# Patient Record
Sex: FEMALE | Race: WHITE | Employment: FULL TIME | URBAN - METROPOLITAN AREA
[De-identification: names, ages, dates, MRNs, and addresses within clinical notes are randomized per-mention and may not be internally consistent; named-entity substitution may affect disease eponyms.]

---

## 2017-08-10 RX ORDER — ESTRADIOL 0.5 MG/1
TABLET ORAL
Qty: 90 TABLET | Refills: 3 | Status: SHIPPED | OUTPATIENT
Start: 2017-08-10 | End: 2018-10-17 | Stop reason: SDUPTHER

## 2017-08-31 ENCOUNTER — OFFICE VISIT (OUTPATIENT)
Dept: OBGYN CLINIC | Age: 57
End: 2017-08-31
Payer: COMMERCIAL

## 2017-08-31 VITALS
HEIGHT: 63 IN | BODY MASS INDEX: 26.05 KG/M2 | WEIGHT: 147 LBS | SYSTOLIC BLOOD PRESSURE: 148 MMHG | DIASTOLIC BLOOD PRESSURE: 100 MMHG

## 2017-08-31 DIAGNOSIS — Z12.31 ENCOUNTER FOR SCREENING MAMMOGRAM FOR MALIGNANT NEOPLASM OF BREAST: Primary | ICD-10-CM

## 2017-08-31 PROCEDURE — 99396 PREV VISIT EST AGE 40-64: CPT | Performed by: OBSTETRICS & GYNECOLOGY

## 2017-08-31 ASSESSMENT — ENCOUNTER SYMPTOMS
BACK PAIN: 0
DIARRHEA: 0
ABDOMINAL PAIN: 0
COUGH: 0
TROUBLE SWALLOWING: 0
APNEA: 0
BLOOD IN STOOL: 0
CHEST TIGHTNESS: 0
SHORTNESS OF BREATH: 0
RECTAL PAIN: 0
SORE THROAT: 0
NAUSEA: 0
WHEEZING: 0
ABDOMINAL DISTENTION: 0
PHOTOPHOBIA: 0
ANAL BLEEDING: 0
CONSTIPATION: 0

## 2017-09-25 DIAGNOSIS — Z12.31 ENCOUNTER FOR SCREENING MAMMOGRAM FOR MALIGNANT NEOPLASM OF BREAST: ICD-10-CM

## 2018-04-04 ENCOUNTER — HOSPITAL ENCOUNTER (OUTPATIENT)
Age: 58
Setting detail: SPECIMEN
Discharge: HOME | End: 2018-04-04
Payer: COMMERCIAL

## 2018-04-04 LAB
ALBUMIN SERPL-MCNC: 4.2 G/DL (ref 3.5–5.2)
ALBUMIN/GLOBULIN RATIO: 1.2 (ref 1–2.5)
ALP BLD-CCNC: 83 U/L (ref 35–104)
ALT SERPL-CCNC: 14 U/L (ref 5–33)
ANION GAP SERPL CALCULATED.3IONS-SCNC: 10 MMOL/L (ref 9–17)
AST SERPL-CCNC: 21 U/L
BILIRUB SERPL-MCNC: 0.31 MG/DL (ref 0.3–1.2)
BILIRUBIN DIRECT: <0.08 MG/DL
BILIRUBIN, INDIRECT: NORMAL MG/DL (ref 0–1)
BUN BLDV-MCNC: 16 MG/DL (ref 6–20)
CALCIUM SERPL-MCNC: 9.1 MG/DL (ref 8.6–10.4)
CHLORIDE BLD-SCNC: 104 MMOL/L (ref 98–107)
CHOLESTEROL/HDL RATIO: 3.4
CHOLESTEROL: 257 MG/DL
CO2: 26 MMOL/L (ref 20–31)
CREAT SERPL-MCNC: 0.76 MG/DL (ref 0.5–0.9)
GFR AFRICAN AMERICAN: >60 ML/MIN
GFR NON-AFRICAN AMERICAN: >60 ML/MIN
GFR SERPL CREATININE-BSD FRML MDRD: NORMAL ML/MIN/{1.73_M2}
GFR SERPL CREATININE-BSD FRML MDRD: NORMAL ML/MIN/{1.73_M2}
GLUCOSE BLD-MCNC: 84 MG/DL (ref 70–99)
HCT VFR BLD CALC: 41.1 % (ref 36.3–47.1)
HDLC SERPL-MCNC: 75 MG/DL
HEMOGLOBIN: 13.2 G/DL (ref 11.9–15.1)
LDL CHOLESTEROL: 160 MG/DL (ref 0–130)
MCH RBC QN AUTO: 30.3 PG (ref 25.2–33.5)
MCHC RBC AUTO-ENTMCNC: 32.1 G/DL (ref 28.4–34.8)
MCV RBC AUTO: 94.5 FL (ref 82.6–102.9)
NRBC AUTOMATED: 0 PER 100 WBC
PDW BLD-RTO: 12.4 % (ref 11.8–14.4)
PLATELET # BLD: 242 K/UL (ref 138–453)
PMV BLD AUTO: 11.9 FL (ref 8.1–13.5)
POTASSIUM SERPL-SCNC: 4.2 MMOL/L (ref 3.7–5.3)
RBC # BLD: 4.35 M/UL (ref 3.95–5.11)
SODIUM BLD-SCNC: 140 MMOL/L (ref 135–144)
TOTAL PROTEIN: 7.6 G/DL (ref 6.4–8.3)
TRIGL SERPL-MCNC: 112 MG/DL
VLDLC SERPL CALC-MCNC: ABNORMAL MG/DL (ref 1–30)
WBC # BLD: 8.4 K/UL (ref 3.5–11.3)

## 2018-09-23 RX ORDER — ESTRADIOL 0.5 MG/1
TABLET ORAL
Qty: 90 TABLET | Refills: 3 | OUTPATIENT
Start: 2018-09-23

## 2018-09-24 ENCOUNTER — TELEPHONE (OUTPATIENT)
Dept: OBGYN CLINIC | Age: 58
End: 2018-09-24

## 2018-09-26 RX ORDER — ESTRADIOL 0.5 MG/1
0.5 TABLET ORAL DAILY
Qty: 90 TABLET | Refills: 4 | Status: SHIPPED | OUTPATIENT
Start: 2018-09-26 | End: 2018-10-17 | Stop reason: SDUPTHER

## 2018-09-26 RX ORDER — ESTRADIOL 0.5 MG/1
0.5 TABLET ORAL DAILY
Qty: 30 TABLET | Refills: 3 | Status: SHIPPED | OUTPATIENT
Start: 2018-09-26 | End: 2018-09-26 | Stop reason: SDUPTHER

## 2018-10-17 ENCOUNTER — OFFICE VISIT (OUTPATIENT)
Dept: OBGYN CLINIC | Age: 58
End: 2018-10-17
Payer: COMMERCIAL

## 2018-10-17 VITALS
BODY MASS INDEX: 26.75 KG/M2 | SYSTOLIC BLOOD PRESSURE: 128 MMHG | WEIGHT: 151 LBS | HEIGHT: 63 IN | DIASTOLIC BLOOD PRESSURE: 86 MMHG

## 2018-10-17 DIAGNOSIS — Z01.419 ENCOUNTER FOR ROUTINE GYNECOLOGICAL EXAMINATION WITH PAPANICOLAOU SMEAR OF CERVIX: Primary | ICD-10-CM

## 2018-10-17 DIAGNOSIS — Z12.11 ENCOUNTER FOR FECAL IMMUNOCHEMICAL TEST SCREENING: ICD-10-CM

## 2018-10-17 DIAGNOSIS — Z12.31 ENCOUNTER FOR SCREENING MAMMOGRAM FOR BREAST CANCER: ICD-10-CM

## 2018-10-17 PROCEDURE — 99396 PREV VISIT EST AGE 40-64: CPT | Performed by: OBSTETRICS & GYNECOLOGY

## 2018-10-17 RX ORDER — ESTRADIOL 0.5 MG/1
0.5 TABLET ORAL DAILY
Qty: 90 TABLET | Refills: 3 | Status: SHIPPED | OUTPATIENT
Start: 2018-10-17 | End: 2019-11-12 | Stop reason: SDUPTHER

## 2018-10-17 ASSESSMENT — ENCOUNTER SYMPTOMS
NAUSEA: 0
SORE THROAT: 0
CONSTIPATION: 0
TROUBLE SWALLOWING: 0
ABDOMINAL DISTENTION: 0
WHEEZING: 0
DIARRHEA: 0
APNEA: 0
PHOTOPHOBIA: 0
ANAL BLEEDING: 0
BACK PAIN: 0
BLOOD IN STOOL: 0
SHORTNESS OF BREATH: 0
COUGH: 0
RECTAL PAIN: 0
CHEST TIGHTNESS: 0
ABDOMINAL PAIN: 0

## 2018-10-17 NOTE — PROGRESS NOTES
10:43 AM   Result Value Ref Range    Cholesterol 257 (H) <200 mg/dL    HDL 75 >40 mg/dL    LDL Cholesterol 160 (H) 0 - 130 mg/dL    Chol/HDL Ratio 3.4 <5    Triglycerides 112 <150 mg/dL    VLDL NOT REPORTED 1 - 30 mg/dL     Past Medical History:   Diagnosis Date    Asthma     Migraine       Past Surgical History:   Procedure Laterality Date    BREAST SURGERY Right     benign cyst age 27    Lien Dunk EYE SURGERY      ag e 3 for lazy eye    JANAE AND BSO  2004     Family History   Problem Relation Age of Onset    Heart Disease Mother         heart valve replacement    Hypertension Father         stroke    Heart Attack Maternal Grandmother     Heart Attack Maternal Grandfather     Diabetes Paternal Grandfather      History   Smoking Status    Never Smoker   Smokeless Tobacco    Never Used     History   Alcohol Use    Yes     Comment: occasional beer       MEDICATIONS:  Current Outpatient Prescriptions   Medication Sig Dispense Refill    estradiol (ESTRACE) 0.5 MG tablet Take 1 tablet by mouth daily 90 tablet 3    butalbital-acetaminophen-caffeine (FIORICET) -40 MG per tablet Take 1 tablet by mouth every 4 hours as needed for Headaches 30 tablet 3    Cetirizine HCl (ZYRTEC ALLERGY PO) Take by mouth      aspirin 81 MG tablet Take 81 mg by mouth daily       No current facility-administered medications for this visit. ALLERGIES:  Compazine [prochlorperazine] and Stadol [butorphanol]    Review of Systems   Constitutional: Negative for activity change, appetite change, fatigue, fever and unexpected weight change. HENT: Negative for congestion, dental problem, hearing loss, mouth sores, sore throat and trouble swallowing. Eyes: Negative for photophobia and visual disturbance. Respiratory: Negative for apnea, cough, chest tightness, shortness of breath and wheezing. Cardiovascular: Negative for chest pain, palpitations and leg swelling.    Gastrointestinal: Negative for abdominal distention,

## 2019-04-03 ENCOUNTER — OFFICE VISIT (OUTPATIENT)
Dept: OBGYN CLINIC | Age: 59
End: 2019-04-03
Payer: COMMERCIAL

## 2019-04-03 VITALS
HEIGHT: 62 IN | SYSTOLIC BLOOD PRESSURE: 138 MMHG | BODY MASS INDEX: 28.16 KG/M2 | DIASTOLIC BLOOD PRESSURE: 76 MMHG | WEIGHT: 153 LBS

## 2019-04-03 DIAGNOSIS — N63.20 LEFT BREAST LUMP: Primary | ICD-10-CM

## 2019-04-03 PROCEDURE — 99213 OFFICE O/P EST LOW 20 MIN: CPT | Performed by: OBSTETRICS & GYNECOLOGY

## 2019-04-03 NOTE — PROGRESS NOTES
Martina Carias is being seen for   Chief Complaint   Patient presents with    Breast Mass     left since monday in axilla area, mammogram 09/25/18 [facility]'s to fax result       HPI:The patient is a 59 y.o. Roxy Butler, seen today regarding a mass in left armpit which she noticed when changing her clothes two days ago. Denies respiratory infection symptoms and sore throat. No injury though was moving furniture. Problem is confined to axilla with no pain, redness, nipple discharge or skin changes in either breast.  Is on 0.5 mg of estrace, unchanged  . HPI    Vital Signs  /76   Ht 5' 2.25\" (1.581 m)   Wt 153 lb (69.4 kg)   Breastfeeding? No   BMI 27.76 kg/m²   No results found for this or any previous visit (from the past 2016 hour(s)). OBGyn Exam    Breast:  Tender area in left axilla, maybe enlarged lymph node. Does not involve the breast.  Breasts bilaterally are symmetric, nontender with no masses or nipple discharge. Pelvic: Exam deferred. Assessment:   Diagnosis Orders   1. Left breast lump           PLAN:  Monitor and if seems to be enlarging or becomes painful, contact family doctor for further evaluation. Due for mammogram in September and annual here in October. Patient was notified that mammogram was normal in September; results being faxed from [facility]. Will review when fax arrives. Return to the office 6 months or prn . Patient was seen with total face to face time of 15 minutes.

## 2019-04-04 DIAGNOSIS — Z12.31 ENCOUNTER FOR SCREENING MAMMOGRAM FOR BREAST CANCER: ICD-10-CM

## 2019-11-12 ENCOUNTER — OFFICE VISIT (OUTPATIENT)
Dept: OBGYN CLINIC | Age: 59
End: 2019-11-12
Payer: COMMERCIAL

## 2019-11-12 VITALS
WEIGHT: 149.8 LBS | HEIGHT: 62 IN | BODY MASS INDEX: 27.57 KG/M2 | DIASTOLIC BLOOD PRESSURE: 70 MMHG | SYSTOLIC BLOOD PRESSURE: 130 MMHG

## 2019-11-12 DIAGNOSIS — Z01.419 WOMEN'S ANNUAL ROUTINE GYNECOLOGICAL EXAMINATION: Primary | ICD-10-CM

## 2019-11-12 DIAGNOSIS — Z12.31 ENCOUNTER FOR SCREENING MAMMOGRAM FOR BREAST CANCER: ICD-10-CM

## 2019-11-12 PROCEDURE — 99396 PREV VISIT EST AGE 40-64: CPT | Performed by: OBSTETRICS & GYNECOLOGY

## 2019-11-12 RX ORDER — ESTRADIOL 0.5 MG/1
0.5 TABLET ORAL DAILY
Qty: 90 TABLET | Refills: 3 | Status: SHIPPED | OUTPATIENT
Start: 2019-11-12

## 2019-11-12 RX ORDER — BUTALBITAL, ACETAMINOPHEN AND CAFFEINE 50; 325; 40 MG/1; MG/1; MG/1
1 TABLET ORAL EVERY 4 HOURS PRN
Qty: 30 TABLET | Refills: 3 | Status: SHIPPED | OUTPATIENT
Start: 2019-11-12

## 2019-11-12 ASSESSMENT — ENCOUNTER SYMPTOMS
CONSTIPATION: 0
COUGH: 0
CHEST TIGHTNESS: 0
TROUBLE SWALLOWING: 0
ABDOMINAL DISTENTION: 0
RECTAL PAIN: 0
NAUSEA: 0
SORE THROAT: 0
DIARRHEA: 0
SHORTNESS OF BREATH: 0
ANAL BLEEDING: 0
PHOTOPHOBIA: 0
BLOOD IN STOOL: 0
APNEA: 0
ABDOMINAL PAIN: 0
BACK PAIN: 0
WHEEZING: 0

## 2019-11-21 ENCOUNTER — HOSPITAL ENCOUNTER (OUTPATIENT)
Age: 59
Discharge: HOME | End: 2019-11-21
Payer: COMMERCIAL

## 2019-11-21 LAB
ALBUMIN SERPL-MCNC: 4.2 G/DL (ref 3.5–5.2)
ALBUMIN/GLOBULIN RATIO: 1.3 (ref 1–2.5)
ALP BLD-CCNC: 88 U/L (ref 35–104)
ALT SERPL-CCNC: 16 U/L (ref 5–33)
ANION GAP SERPL CALCULATED.3IONS-SCNC: 10 MMOL/L (ref 9–17)
AST SERPL-CCNC: 19 U/L
BILIRUB SERPL-MCNC: 0.24 MG/DL (ref 0.3–1.2)
BUN BLDV-MCNC: 14 MG/DL (ref 8–23)
BUN/CREAT BLD: ABNORMAL (ref 9–20)
CALCIUM SERPL-MCNC: 8.8 MG/DL (ref 8.6–10.4)
CHLORIDE BLD-SCNC: 104 MMOL/L (ref 98–107)
CHOLESTEROL/HDL RATIO: 3.7
CHOLESTEROL: 268 MG/DL
CO2: 26 MMOL/L (ref 20–31)
CREAT SERPL-MCNC: 0.76 MG/DL (ref 0.5–0.9)
GFR AFRICAN AMERICAN: >60 ML/MIN
GFR NON-AFRICAN AMERICAN: >60 ML/MIN
GFR SERPL CREATININE-BSD FRML MDRD: ABNORMAL ML/MIN/{1.73_M2}
GFR SERPL CREATININE-BSD FRML MDRD: ABNORMAL ML/MIN/{1.73_M2}
GLUCOSE BLD-MCNC: 94 MG/DL (ref 70–99)
HCT VFR BLD CALC: 43 % (ref 36.3–47.1)
HDLC SERPL-MCNC: 72 MG/DL
HEMOGLOBIN: 13.8 G/DL (ref 11.9–15.1)
LDL CHOLESTEROL: 178 MG/DL (ref 0–130)
MCH RBC QN AUTO: 30.6 PG (ref 25.2–33.5)
MCHC RBC AUTO-ENTMCNC: 32.1 G/DL (ref 28.4–34.8)
MCV RBC AUTO: 95.3 FL (ref 82.6–102.9)
NRBC AUTOMATED: 0 PER 100 WBC
PDW BLD-RTO: 12.3 % (ref 11.8–14.4)
PLATELET # BLD: 259 K/UL (ref 138–453)
PMV BLD AUTO: 12.1 FL (ref 8.1–13.5)
POTASSIUM SERPL-SCNC: 4.3 MMOL/L (ref 3.7–5.3)
RBC # BLD: 4.51 M/UL (ref 3.95–5.11)
SODIUM BLD-SCNC: 140 MMOL/L (ref 135–144)
TOTAL PROTEIN: 7.4 G/DL (ref 6.4–8.3)
TRIGL SERPL-MCNC: 89 MG/DL
VLDLC SERPL CALC-MCNC: ABNORMAL MG/DL (ref 1–30)
WBC # BLD: 5.5 K/UL (ref 3.5–11.3)

## 2019-11-21 PROCEDURE — 80061 LIPID PANEL: CPT

## 2019-11-21 PROCEDURE — 80053 COMPREHEN METABOLIC PANEL: CPT

## 2019-11-21 PROCEDURE — 85027 COMPLETE CBC AUTOMATED: CPT

## 2019-11-21 PROCEDURE — 36415 COLL VENOUS BLD VENIPUNCTURE: CPT

## 2019-11-25 DIAGNOSIS — Z12.31 ENCOUNTER FOR SCREENING MAMMOGRAM FOR BREAST CANCER: ICD-10-CM

## 2022-06-07 ENCOUNTER — HOSPITAL ENCOUNTER (EMERGENCY)
Age: 62
Discharge: HOME | End: 2022-06-07
Attending: EMERGENCY MEDICINE
Payer: COMMERCIAL

## 2022-06-07 VITALS
TEMPERATURE: 97.7 F | HEART RATE: 95 BPM | DIASTOLIC BLOOD PRESSURE: 79 MMHG | OXYGEN SATURATION: 96 % | SYSTOLIC BLOOD PRESSURE: 147 MMHG | RESPIRATION RATE: 15 BRPM

## 2022-06-07 DIAGNOSIS — R19.7 DIARRHEA OF PRESUMED INFECTIOUS ORIGIN: Primary | ICD-10-CM

## 2022-06-07 LAB
ABSOLUTE EOS #: 0.11 K/UL (ref 0–0.44)
ABSOLUTE IMMATURE GRANULOCYTE: <0.03 K/UL (ref 0–0.3)
ABSOLUTE LYMPH #: 2.17 K/UL (ref 1.1–3.7)
ABSOLUTE MONO #: 0.56 K/UL (ref 0.1–1.2)
ALBUMIN SERPL-MCNC: 4.3 G/DL (ref 3.5–5.2)
ALBUMIN/GLOBULIN RATIO: 1.3 (ref 1–2.5)
ALP BLD-CCNC: 118 U/L (ref 35–104)
ALT SERPL-CCNC: 21 U/L (ref 5–33)
ANION GAP SERPL CALCULATED.3IONS-SCNC: 15 MMOL/L (ref 9–17)
AST SERPL-CCNC: 30 U/L
BASOPHILS # BLD: 0 % (ref 0–2)
BASOPHILS ABSOLUTE: 0.04 K/UL (ref 0–0.2)
BILIRUB SERPL-MCNC: 0.29 MG/DL (ref 0.3–1.2)
BUN BLDV-MCNC: 11 MG/DL (ref 8–23)
CALCIUM SERPL-MCNC: 10 MG/DL (ref 8.6–10.4)
CHLORIDE BLD-SCNC: 98 MMOL/L (ref 98–107)
CO2: 20 MMOL/L (ref 20–31)
CREAT SERPL-MCNC: 0.85 MG/DL (ref 0.5–0.9)
EOSINOPHILS RELATIVE PERCENT: 1 % (ref 1–4)
GFR AFRICAN AMERICAN: >60 ML/MIN
GFR NON-AFRICAN AMERICAN: >60 ML/MIN
GFR SERPL CREATININE-BSD FRML MDRD: ABNORMAL ML/MIN/{1.73_M2}
GLUCOSE BLD-MCNC: 119 MG/DL (ref 70–99)
HCT VFR BLD CALC: 42.2 % (ref 36.3–47.1)
HEMOGLOBIN: 14.5 G/DL (ref 11.9–15.1)
IMMATURE GRANULOCYTES: 0 %
LYMPHOCYTES # BLD: 24 % (ref 24–43)
MCH RBC QN AUTO: 31.3 PG (ref 25.2–33.5)
MCHC RBC AUTO-ENTMCNC: 34.4 G/DL (ref 28.4–34.8)
MCV RBC AUTO: 90.9 FL (ref 82.6–102.9)
MONOCYTES # BLD: 6 % (ref 3–12)
NRBC AUTOMATED: 0 PER 100 WBC
PDW BLD-RTO: 12.3 % (ref 11.8–14.4)
PLATELET # BLD: 270 K/UL (ref 138–453)
PMV BLD AUTO: 11.8 FL (ref 8.1–13.5)
POTASSIUM SERPL-SCNC: 4.2 MMOL/L (ref 3.7–5.3)
RBC # BLD: 4.64 M/UL (ref 3.95–5.11)
SEG NEUTROPHILS: 69 % (ref 36–65)
SEGMENTED NEUTROPHILS ABSOLUTE COUNT: 6.21 K/UL (ref 1.5–8.1)
SODIUM BLD-SCNC: 133 MMOL/L (ref 135–144)
TOTAL PROTEIN: 7.7 G/DL (ref 6.4–8.3)
WBC # BLD: 9.1 K/UL (ref 3.5–11.3)

## 2022-06-07 PROCEDURE — 85025 COMPLETE CBC W/AUTO DIFF WBC: CPT

## 2022-06-07 PROCEDURE — 80053 COMPREHEN METABOLIC PANEL: CPT

## 2022-06-07 PROCEDURE — 99283 EMERGENCY DEPT VISIT LOW MDM: CPT

## 2022-06-07 ASSESSMENT — ENCOUNTER SYMPTOMS
DIARRHEA: 0
CONSTIPATION: 0
NAUSEA: 0
VOMITING: 0
SORE THROAT: 0
ABDOMINAL PAIN: 0
SHORTNESS OF BREATH: 0

## 2022-06-07 NOTE — ED PROVIDER NOTES
[facility] ED  Emergency Department Encounter  EmergencyMedicine Resident     Jeff Han  MRN: [id number]  [place]shulgfurt 1960  Date of evaluation: 6/7/22  PCP:  Joselyn Anton DO    CHIEF COMPLAINT       Chief Complaint   Patient presents with    Abdominal Pain     states started yesterday, bloody stools noted with N/V       HISTORY OF PRESENT ILLNESS  (Location/Symptom, Timing/Onset, Context/Setting, Quality, Duration, Modifying Factors, Severity.)      Burton Brito is a 59 y.o. female who presents to the emergency department with a 1 day history of resolving diarrhea with abdominal cramping. Patient made some egg salad yesterday that only she ate, and then later that night had a 30-minute bowel movement consisting of mostly diarrhea and very painful with a lot of abdominal cramping and sweating, and 1 episode of vomiting. Patient slept and then woke up the next day and had another bowel movement, this time with small amounts of blood. She had 1 other episode of diarrhea and so came into the emergency department for evaluation. Each episode of diarrhea is lower in volume and intensity than the first 1 and patient and  are wondering if the egg salad it may have been bad. She denies fever, chills, current nausea or vomiting (none today), chest pain or shortness of breath, abdominal pain currently, problems with urination or bowel movements, or numbness or tingling anywhere. PAST MEDICAL / SURGICAL / SOCIAL / FAMILY HISTORY      has a past medical history of Asthma and Migraine. has a past surgical history that includes marisela and bso (cervix removed) (2004); Eye surgery; and Breast surgery (Right).     Social History     Socioeconomic History    Marital status:      Spouse name: Not on file    Number of children: Not on file    Years of education: Not on file    Highest education level: Not on file   Occupational History    Not on file   Tobacco Use  Smoking status: Never Smoker    Smokeless tobacco: Never Used   Substance and Sexual Activity    Alcohol use: Yes     Comment: occasional beer    Drug use: Not on file    Sexual activity: Not on file   Other Topics Concern    Not on file   Social History Narrative    Not on file     Social Determinants of Health     Financial Resource Strain:     Difficulty of Paying Living Expenses: Not on file   Food Insecurity:     Worried About Running Out of Food in the Last Year: Not on file    Lizzy of Food in the Last Year: Not on file   Transportation Needs:     Lack of Transportation (Medical): Not on file    Lack of Transportation (Non-Medical):  Not on file   Physical Activity:     Days of Exercise per Week: Not on file    Minutes of Exercise per Session: Not on file   Stress:     Feeling of Stress : Not on file   Social Connections:     Frequency of Communication with Friends and Family: Not on file    Frequency of Social Gatherings with Friends and Family: Not on file    Attends Christianity Services: Not on file    Active Member of [address] [place] [address] [employer] or Organizations: Not on file    Attends Club or Organization Meetings: Not on file    Marital Status: Not on file   Intimate Partner Violence:     Fear of Current or Ex-Partner: Not on file    Emotionally Abused: Not on file    Physically Abused: Not on file    Sexually Abused: Not on file   Housing Stability:     Unable to Pay for Housing in the Last Year: Not on file    Number of [place] in the Last Year: Not on file    Unstable Housing in the Last Year: Not on file       Family History   Problem Relation Age of Onset    Heart Disease Mother         heart valve replacement    Hypertension Father         stroke    Heart Attack Maternal Grandmother     Heart Attack Maternal Grandfather     Diabetes Paternal Grandfather        Allergies:  Compazine [prochlorperazine] and Stadol [butorphanol]    Home Medications:  Prior to Admission medications Medication Sig Start Date End Date Taking? Authorizing Provider   butalbital-acetaminophen-caffeine (FIORICET) -40 MG per tablet Take 1 tablet by mouth every 4 hours as needed for Headaches 11/12/19   Abel Riedel, MD   estradiol (ESTRACE) 0.5 MG tablet Take 1 tablet by mouth daily 11/12/19   Abel Riedel, MD   Cetirizine HCl (ZYRTEC ALLERGY PO) Take by mouth    Historical Provider, MD       REVIEW OF SYSTEMS    (2-9 systems for level 4, 10 or more for level 5)      Review of Systems   Constitutional: Negative for chills and fever. HENT: Negative for ear pain, hearing loss and sore throat. Eyes: Negative for visual disturbance. Respiratory: Negative for shortness of breath. Cardiovascular: Negative for chest pain. Gastrointestinal: Negative for abdominal pain, constipation, diarrhea, nausea and vomiting. Genitourinary: Negative for difficulty urinating and dysuria. Musculoskeletal: Negative for arthralgias and myalgias. Neurological: Negative for numbness. Psychiatric/Behavioral: Negative for agitation and confusion. PHYSICAL EXAM   (up to 7 for level 4, 8 or more for level 5)      INITIAL VITALS:   BP (!) 147/79   Pulse 95   Temp 97.7 °F (36.5 °C) (Oral)   Resp 15   SpO2 96%     Physical Exam  Vitals and nursing note reviewed. Constitutional:       General: She is not in acute distress. Appearance: Normal appearance. She is well-developed. She is not ill-appearing or diaphoretic. HENT:      Head: Normocephalic and atraumatic. Right Ear: External ear normal.      Left Ear: External ear normal.      Nose: Nose normal.      Mouth/Throat:      Mouth: Mucous membranes are moist.   Eyes:      Extraocular Movements: Extraocular movements intact. Conjunctiva/sclera: Conjunctivae normal.   Neck:      Trachea: No tracheal deviation. Cardiovascular:      Rate and Rhythm: Normal rate and regular rhythm. Heart sounds: Normal heart sounds. No murmur heard.   No friction rub. No gallop. Pulmonary:      Effort: Pulmonary effort is normal. No respiratory distress. Breath sounds: Normal breath sounds. No wheezing, rhonchi or rales. Abdominal:      General: Abdomen is flat. There is no distension. Palpations: Abdomen is soft. There is no mass. Tenderness: There is no abdominal tenderness. There is no right CVA tenderness, left CVA tenderness, guarding or rebound. Comments: Abdomen demonstrates no tenderness to deep palpation, soft   Genitourinary:     Comments: Digital rectal examination demonstrated palpable hemorrhoid and guaiac positive stool  Musculoskeletal:         General: No swelling, deformity or signs of injury. Normal range of motion. Cervical back: Normal range of motion and neck supple. Skin:     General: Skin is warm and dry. Coloration: Skin is not jaundiced. Findings: No bruising or lesion. Neurological:      General: No focal deficit present. Mental Status: She is alert and oriented to person, place, and time. Mental status is at baseline. Motor: No abnormal muscle tone. DIFFERENTIAL  DIAGNOSIS     PLAN (LABS / IMAGING / EKG):  Orders Placed This Encounter   Procedures    CBC with Auto Differential    Comprehensive Metabolic Panel w/ Reflex to MG       MEDICATIONS ORDERED:  No orders of the defined types were placed in this encounter. DDX: Myles Mann is a 59 y.o. female who presents to the emergency department with abdominal pain, resolved.  Differential diagnosis includes food poisoning, gastroenteritis    DIAGNOSTIC RESULTS / EMERGENCY DEPARTMENT COURSE / MDM   LAB RESULTS:  Results for orders placed or performed during the hospital encounter of 06/07/22   CBC with Auto Differential   Result Value Ref Range    WBC 9.1 3.5 - 11.3 k/uL    RBC 4.64 3.95 - 5.11 m/uL    Hemoglobin 14.5 11.9 - 15.1 g/dL    Hematocrit 42.2 36.3 - 47.1 %    MCV 90.9 82.6 - 102.9 fL    MCH 31.3 25.2 - 33.5 pg    MCHC 34.4 28.4 - 34.8 g/dL    RDW 12.3 11.8 - 14.4 %    Platelets 368 935 - 018 k/uL    MPV 11.8 8.1 - 13.5 fL    NRBC Automated 0.0 0.0 per 100 WBC    Seg Neutrophils 69 (H) 36 - 65 %    Lymphocytes 24 24 - 43 %    Monocytes 6 3 - 12 %    Eosinophils % 1 1 - 4 %    Basophils 0 0 - 2 %    Immature Granulocytes 0 0 %    Segs Absolute 6.21 1.50 - 8.10 k/uL    Absolute Lymph # 2.17 1.10 - 3.70 k/uL    Absolute Mono # 0.56 0.10 - 1.20 k/uL    Absolute Eos # 0.11 0.00 - 0.44 k/uL    Basophils Absolute 0.04 0.00 - 0.20 k/uL    Absolute Immature Granulocyte <0.03 0.00 - 0.30 k/uL   Comprehensive Metabolic Panel w/ Reflex to MG   Result Value Ref Range    Glucose 119 (H) 70 - 99 mg/dL    BUN 11 8 - 23 mg/dL    CREATININE 0.85 0.50 - 0.90 mg/dL    Calcium 10.0 8.6 - 10.4 mg/dL    Sodium 133 (L) 135 - 144 mmol/L    Potassium 4.2 3.7 - 5.3 mmol/L    Chloride 98 98 - 107 mmol/L    CO2 20 20 - 31 mmol/L    Anion Gap 15 9 - 17 mmol/L    Alkaline Phosphatase 118 (H) 35 - 104 U/L    ALT 21 5 - 33 U/L    AST 30 <32 U/L    Total Bilirubin 0.29 (L) 0.3 - 1.2 mg/dL    Total Protein 7.7 6.4 - 8.3 g/dL    Albumin 4.3 3.5 - 5.2 g/dL    Albumin/Globulin Ratio 1.3 1.0 - 2.5    GFR Non-African American >60 >60 mL/min    GFR African American >60 >60 mL/min    GFR Comment             IMPRESSION: Bev Dodson is a 59 y.o. female who presents to the emergency department with abdominal pain, resolved. On examination she is afebrile, vital signs demonstrate blood pressure 147/79 and examination demonstrates a well-appearing female of stated age with normal heart and lung sounds and a nontender abdomen. Patient is asymptomatic at this time. We will check basic labs to screen for electrolyte abnormalities or leukocytosis, but symptoms appear most consistent with food poisoning. Digital rectal examination was FOBT positive but there was no blood visualized. Small hemorrhoid palpated as well.   Patient counseled on symptomatic control, return precautions, verbalized understanding and agreement with plan. RADIOLOGY:  No results found. EKG  None    All EKG's are interpreted by the Emergency Department Physician who either signs or co-signs this chart in the absence of a cardiologist.    EMERGENCY DEPARTMENT COURSE:       PROCEDURES:  None    CONSULTS:  None    CRITICAL CARE:  None    FINAL IMPRESSION      1. Diarrhea of presumed infectious origin          DISPOSITION / PLAN     DISPOSITION        PATIENT REFERRED TO:  DO Belinda [address] [address]  [phone]    Schedule an appointment as soon as possible for a visit in 1 week  For followup    [facility] ED  [address] [address]  [phone]  Go to   As needed, If symptoms worsen      DISCHARGE MEDICATIONS:  New Prescriptions    No medications on file       Brandon Major MD  Emergency Medicine Resident    This patient was evaluated in the Emergency Department for symptoms described in the history of present illness. He/she was evaluated in the context of the global COVID-19 pandemic, which necessitated consideration that the patient might be at risk for infection with the SARS-CoV-2 virus that causes COVID-19. Institutional protocols and algorithms that pertain to the evaluation of patients at risk for COVID-19 are in a state of rapid change based on information released by regulatory bodies including the CDC and federal and state organizations. These policies and algorithms were followed during the patient's care in the ED.     (Please note that portions of thisnote were completed with a voice recognition program.  Efforts were made to edit the dictations but occasionally words are mis-transcribed.)        Brandon Major MD  Resident  06/07/22 [id number]

## 2022-06-07 NOTE — ED PROVIDER NOTES
[facility]     Emergency Department     Faculty Attestation    I performed a history and physical examination of the patient and discussed management with the resident. I reviewed the residents note and agree with the documented findings and plan of care. Any areas of disagreement are noted on the chart. I was personally present for the key portions of any procedures. I have documented in the chart those procedures where I was not present during the key portions. I have reviewed the emergency nurses triage note. I agree with the chief complaint, past medical history, past surgical history, allergies, medications, social and family history as documented unless otherwise noted below. For Physician Assistant/ Nurse Practitioner cases/documentation I have personally evaluated this patient and have completed at least one if not all key elements of the E/M (history, physical exam, and MDM). Additional findings are as noted. I have personally seen and evaluated the patient. I find the patient's history and physical exam are consistent with the NP/PA documentation. I agree with the care provided, treatment rendered, disposition and follow-up plan. Patient who reports 1 day of vomiting cramping diarrhea patient's symptoms have improved significantly in the last few hours her abdomen is soft and completely nontender cautions were given      Critical Care     Jazmine Prasad M.D.   Attending Emergency  Physician              Padmini Laura MD  06/07/22 [id number]

## 2022-07-19 NOTE — ED PROVIDER NOTES
I was assigned to this patient in error.   I did not care for this patient     Filemon Parham MD  07/19/22 [id number]

## 2022-10-31 LAB — GLUCOSE BLD-MCNC: 94 MG/DL

## 2022-12-16 LAB — MAMMOGRAPHY, EXTERNAL: NORMAL

## 2024-01-02 ENCOUNTER — OFFICE VISIT (OUTPATIENT)
Age: 64
End: 2024-01-02
Payer: MEDICARE

## 2024-01-02 VITALS
HEIGHT: 62 IN | BODY MASS INDEX: 28.85 KG/M2 | TEMPERATURE: 96.3 F | DIASTOLIC BLOOD PRESSURE: 78 MMHG | OXYGEN SATURATION: 96 % | HEART RATE: 76 BPM | WEIGHT: 156.8 LBS | SYSTOLIC BLOOD PRESSURE: 132 MMHG

## 2024-01-02 DIAGNOSIS — Z00.00 WELCOME TO MEDICARE PREVENTIVE VISIT: Primary | ICD-10-CM

## 2024-01-02 DIAGNOSIS — Z23 NEED FOR PROPHYLACTIC VACCINATION AGAINST STREPTOCOCCUS PNEUMONIAE (PNEUMOCOCCUS): ICD-10-CM

## 2024-01-02 DIAGNOSIS — Z13.6 SCREENING FOR CARDIOVASCULAR CONDITION: ICD-10-CM

## 2024-01-02 DIAGNOSIS — Z11.59 NEED FOR HEPATITIS C SCREENING TEST: ICD-10-CM

## 2024-01-02 DIAGNOSIS — Z12.31 ENCOUNTER FOR SCREENING MAMMOGRAM FOR BREAST CANCER: ICD-10-CM

## 2024-01-02 DIAGNOSIS — Z78.0 ASYMPTOMATIC MENOPAUSAL STATE: ICD-10-CM

## 2024-01-02 DIAGNOSIS — Z72.89 OTHER PROBLEMS RELATED TO LIFESTYLE: ICD-10-CM

## 2024-01-02 DIAGNOSIS — G43.109 MIGRAINE WITH AURA AND WITHOUT STATUS MIGRAINOSUS, NOT INTRACTABLE: ICD-10-CM

## 2024-01-02 PROCEDURE — 1123F ACP DISCUSS/DSCN MKR DOCD: CPT | Performed by: PHYSICIAN ASSISTANT

## 2024-01-02 PROCEDURE — G0402 INITIAL PREVENTIVE EXAM: HCPCS | Performed by: PHYSICIAN ASSISTANT

## 2024-01-02 PROCEDURE — G0009 ADMIN PNEUMOCOCCAL VACCINE: HCPCS | Performed by: PHYSICIAN ASSISTANT

## 2024-01-02 PROCEDURE — 3017F COLORECTAL CA SCREEN DOC REV: CPT | Performed by: PHYSICIAN ASSISTANT

## 2024-01-02 PROCEDURE — 90677 PCV20 VACCINE IM: CPT | Performed by: PHYSICIAN ASSISTANT

## 2024-01-02 RX ORDER — BUTALBITAL/ACETAMINOPHEN 50MG-325MG
1 TABLET ORAL EVERY 8 HOURS PRN
Qty: 30 TABLET | Refills: 1 | Status: SHIPPED | OUTPATIENT
Start: 2024-01-02

## 2024-01-02 SDOH — ECONOMIC STABILITY: INCOME INSECURITY: HOW HARD IS IT FOR YOU TO PAY FOR THE VERY BASICS LIKE FOOD, HOUSING, MEDICAL CARE, AND HEATING?: NOT HARD AT ALL

## 2024-01-02 SDOH — ECONOMIC STABILITY: FOOD INSECURITY: WITHIN THE PAST 12 MONTHS, YOU WORRIED THAT YOUR FOOD WOULD RUN OUT BEFORE YOU GOT MONEY TO BUY MORE.: NEVER TRUE

## 2024-01-02 SDOH — ECONOMIC STABILITY: FOOD INSECURITY: WITHIN THE PAST 12 MONTHS, THE FOOD YOU BOUGHT JUST DIDN'T LAST AND YOU DIDN'T HAVE MONEY TO GET MORE.: NEVER TRUE

## 2024-01-02 SDOH — ECONOMIC STABILITY: HOUSING INSECURITY
IN THE LAST 12 MONTHS, WAS THERE A TIME WHEN YOU DID NOT HAVE A STEADY PLACE TO SLEEP OR SLEPT IN A SHELTER (INCLUDING NOW)?: NO

## 2024-01-02 ASSESSMENT — ENCOUNTER SYMPTOMS
EYE PAIN: 0
SINUS PRESSURE: 0
NAUSEA: 0
ABDOMINAL PAIN: 0
DIARRHEA: 0
SORE THROAT: 0
SHORTNESS OF BREATH: 0
SINUS PAIN: 0
RHINORRHEA: 0
BACK PAIN: 0
BLOOD IN STOOL: 0
COUGH: 0
VOMITING: 0
WHEEZING: 0
EYE REDNESS: 0
CONSTIPATION: 0

## 2024-01-02 ASSESSMENT — PATIENT HEALTH QUESTIONNAIRE - PHQ9
SUM OF ALL RESPONSES TO PHQ QUESTIONS 1-9: 0
SUM OF ALL RESPONSES TO PHQ QUESTIONS 1-9: 0
SUM OF ALL RESPONSES TO PHQ9 QUESTIONS 1 & 2: 0
2. FEELING DOWN, DEPRESSED OR HOPELESS: 0
1. LITTLE INTEREST OR PLEASURE IN DOING THINGS: 0
SUM OF ALL RESPONSES TO PHQ QUESTIONS 1-9: 0
SUM OF ALL RESPONSES TO PHQ QUESTIONS 1-9: 0

## 2024-01-02 ASSESSMENT — LIFESTYLE VARIABLES
HOW MANY STANDARD DRINKS CONTAINING ALCOHOL DO YOU HAVE ON A TYPICAL DAY: 1 OR 2
HOW OFTEN DO YOU HAVE A DRINK CONTAINING ALCOHOL: 2-4 TIMES A MONTH

## 2024-01-02 NOTE — PROGRESS NOTES
Medicare Annual Wellness Visit    Isabel Leon is here for Medicare AWV    Assessment & Plan   Welcome to Medicare preventive visit  H&P done.  Chart reviewed.  Discussed screening tests.  Discussed vaccines.  Recommend Prevnar 20 vaccine today.  Then she will get the flu vaccine through the pharmacy in a couple of weeks.  Asymptomatic menopausal state  -     DEXA Bone Density Axial Skeleton; Future  Encounter for screening mammogram for breast cancer  -     PARIS Digital Screen Bilateral; Future  Need for hepatitis C screening test  -     Hepatitis C Antibody; Future  Need for prophylactic vaccination against Streptococcus pneumoniae (pneumococcus)  -     Pneumococcal Conjugate PCV20, PF (Prevnar 20)  Other problems related to lifestyle  -     Hepatitis C Antibody; Future  Screening for cardiovascular condition  -     Lipid Panel; Future  Migraine with aura and without status migrainosus, not intractable  -     acetaminophen-butalbital  MG TABS; Take 1 tablet by mouth every 8 hours as needed for Headaches, Disp-30 tablet, R-1Normal  -     Comprehensive Metabolic Panel; Future  Minimal migraines.  She uses the butalbital and acetaminophen as needed which is not very often.  Recommendations for Preventive Services Due: see orders and patient instructions/AVS.  Recommended screening schedule for the next 5-10 years is provided to the patient in written form: see Patient Instructions/AVS.     Return in about 1 year (around 1/2/2025) for MWV.     Subjective       Patient's complete Health Risk Assessment and screening values have been reviewed and are found in Flowsheets. The following problems were reviewed today and where indicated follow up appointments were made and/or referrals ordered.  Review of Systems   Constitutional:  Negative for chills, fever and unexpected weight change.   HENT:  Negative for congestion, ear pain, hearing loss, rhinorrhea, sinus pressure, sinus pain, sneezing and sore throat.

## 2024-01-02 NOTE — PATIENT INSTRUCTIONS

## 2024-01-03 LAB
ALBUMIN SERPL-MCNC: 4.4 G/DL
ALP BLD-CCNC: 113 U/L
ALT SERPL-CCNC: 40 U/L
ANION GAP SERPL CALCULATED.3IONS-SCNC: NORMAL MMOL/L
ANTIBODY: NEGATIVE
AST SERPL-CCNC: 50 U/L
BILIRUB SERPL-MCNC: 0.6 MG/DL (ref 0.1–1.4)
BUN BLDV-MCNC: 15 MG/DL
CALCIUM SERPL-MCNC: 9.5 MG/DL
CHLORIDE BLD-SCNC: 106 MMOL/L
CHOLESTEROL, TOTAL: 272 MG/DL
CHOLESTEROL/HDL RATIO: 3.3
CO2: 27 MMOL/L
CREAT SERPL-MCNC: 0.68 MG/DL
EGFR: 86.8
GLUCOSE BLD-MCNC: 102 MG/DL
HDLC SERPL-MCNC: 82 MG/DL (ref 35–70)
LDL CHOLESTEROL CALCULATED: 157 MG/DL (ref 0–160)
NONHDLC SERPL-MCNC: ABNORMAL MG/DL
POTASSIUM SERPL-SCNC: 4.1 MMOL/L
SODIUM BLD-SCNC: 138 MMOL/L
TOTAL PROTEIN: 7.4
TRIGL SERPL-MCNC: 165 MG/DL
VLDLC SERPL CALC-MCNC: 33 MG/DL

## 2024-01-04 DIAGNOSIS — G43.109 MIGRAINE WITH AURA AND WITHOUT STATUS MIGRAINOSUS, NOT INTRACTABLE: ICD-10-CM

## 2024-01-04 DIAGNOSIS — Z13.6 SCREENING FOR CARDIOVASCULAR CONDITION: ICD-10-CM

## 2024-01-04 DIAGNOSIS — Z11.59 NEED FOR HEPATITIS C SCREENING TEST: ICD-10-CM

## 2024-01-04 DIAGNOSIS — Z72.89 OTHER PROBLEMS RELATED TO LIFESTYLE: ICD-10-CM

## 2024-01-12 ENCOUNTER — OFFICE VISIT (OUTPATIENT)
Age: 64
End: 2024-01-12
Payer: MEDICARE

## 2024-01-12 VITALS
DIASTOLIC BLOOD PRESSURE: 80 MMHG | TEMPERATURE: 98.9 F | HEART RATE: 83 BPM | OXYGEN SATURATION: 97 % | WEIGHT: 156 LBS | HEIGHT: 62 IN | SYSTOLIC BLOOD PRESSURE: 140 MMHG | BODY MASS INDEX: 28.71 KG/M2

## 2024-01-12 DIAGNOSIS — J01.00 ACUTE NON-RECURRENT MAXILLARY SINUSITIS: Primary | ICD-10-CM

## 2024-01-12 PROCEDURE — 1090F PRES/ABSN URINE INCON ASSESS: CPT | Performed by: PHYSICIAN ASSISTANT

## 2024-01-12 PROCEDURE — 3017F COLORECTAL CA SCREEN DOC REV: CPT | Performed by: PHYSICIAN ASSISTANT

## 2024-01-12 PROCEDURE — G8400 PT W/DXA NO RESULTS DOC: HCPCS | Performed by: PHYSICIAN ASSISTANT

## 2024-01-12 PROCEDURE — 1036F TOBACCO NON-USER: CPT | Performed by: PHYSICIAN ASSISTANT

## 2024-01-12 PROCEDURE — 99213 OFFICE O/P EST LOW 20 MIN: CPT | Performed by: PHYSICIAN ASSISTANT

## 2024-01-12 PROCEDURE — G8419 CALC BMI OUT NRM PARAM NOF/U: HCPCS | Performed by: PHYSICIAN ASSISTANT

## 2024-01-12 PROCEDURE — 1123F ACP DISCUSS/DSCN MKR DOCD: CPT | Performed by: PHYSICIAN ASSISTANT

## 2024-01-12 PROCEDURE — G8427 DOCREV CUR MEDS BY ELIG CLIN: HCPCS | Performed by: PHYSICIAN ASSISTANT

## 2024-01-12 PROCEDURE — G8484 FLU IMMUNIZE NO ADMIN: HCPCS | Performed by: PHYSICIAN ASSISTANT

## 2024-01-12 RX ORDER — AMOXICILLIN AND CLAVULANATE POTASSIUM 875; 125 MG/1; MG/1
1 TABLET, FILM COATED ORAL 2 TIMES DAILY
Qty: 20 TABLET | Refills: 0 | Status: SHIPPED | OUTPATIENT
Start: 2024-01-12 | End: 2024-01-22

## 2024-01-12 ASSESSMENT — ENCOUNTER SYMPTOMS
RHINORRHEA: 0
CONSTIPATION: 0
BLOOD IN STOOL: 0
BACK PAIN: 0
VOMITING: 0
SINUS PAIN: 1
SORE THROAT: 0
EYE REDNESS: 0
COUGH: 1
SINUS PRESSURE: 1
SHORTNESS OF BREATH: 0
WHEEZING: 0
NAUSEA: 0
DIARRHEA: 0
EYE PAIN: 0
ABDOMINAL PAIN: 0

## 2024-01-12 NOTE — PROGRESS NOTES
[facility]     Date of Visit:  2024  Patient Name: Isabel Leon   Patient :  1960     CHIEF COMPLAINT:     Isabel Leon is a 63 y.o. female who presents today for an general visit to be evaluated for the following condition(s):  Chief Complaint   Patient presents with    Otalgia    Sinus Problem       HISTORY OF PRESENT ILLNESS      Started with cold a month ago seemed to get better but then started getting sick again  Right ear popping and pressure, right sinus pressure past week.   Only tried tylenol so far.    REVIEW OF SYSTEMS     Review of Systems   Constitutional:  Negative for chills, fever and unexpected weight change.   HENT:  Positive for ear pain, sinus pressure and sinus pain. Negative for congestion, hearing loss, rhinorrhea, sneezing and sore throat.    Eyes:  Negative for pain, redness and visual disturbance.   Respiratory:  Positive for cough. Negative for shortness of breath and wheezing.    Cardiovascular:  Negative for chest pain, palpitations and leg swelling.   Gastrointestinal:  Negative for abdominal pain, blood in stool, constipation, diarrhea, nausea and vomiting.   Endocrine: Negative for cold intolerance and heat intolerance.   Genitourinary:  Negative for difficulty urinating, dysuria, frequency, hematuria and urgency.   Musculoskeletal:  Negative for arthralgias, back pain, gait problem, joint swelling, myalgias and neck pain.   Skin:  Negative for rash and wound.   Allergic/Immunologic: Negative for immunocompromised state.   Neurological:  Negative for dizziness, tremors, seizures, syncope, speech difficulty, weakness, light-headedness, numbness and headaches.   Psychiatric/Behavioral:  Negative for confusion, hallucinations and sleep disturbance. The patient is not nervous/anxious.         REVIEWED INFORMATION      Current Outpatient Medications   Medication Sig Dispense Refill    amoxicillin-clavulanate (AUGMENTIN) 875-125 MG per tablet Take 1 tablet by

## 2024-01-15 DIAGNOSIS — Z12.31 ENCOUNTER FOR SCREENING MAMMOGRAM FOR BREAST CANCER: ICD-10-CM

## 2024-01-15 DIAGNOSIS — Z78.0 ASYMPTOMATIC MENOPAUSAL STATE: ICD-10-CM

## 2024-02-15 ENCOUNTER — HOSPITAL ENCOUNTER (OUTPATIENT)
Age: 64
Setting detail: THERAPIES SERIES
Discharge: HOME | End: 2024-02-15
Payer: MEDICARE

## 2024-02-15 PROCEDURE — 97110 THERAPEUTIC EXERCISES: CPT

## 2024-02-15 PROCEDURE — 97161 PT EVAL LOW COMPLEX 20 MIN: CPT

## 2024-02-15 NOTE — CONSULTS
[x] [facility]  Outpatient Rehabilitation &  Therapy  [address] [place] [address]  [phone]  [phone]                 Date: 2/15/2024    Patient: Isabel Leon  : 1960  MRN: [id number]    Physician: Bertin Lee APRN - NP   Diagnosis: Right Hip Tendonitis    I would like to add Iontophoresis treatments for this patient. We are submitting this prescription for your signature and return. We have been informed by the [place] [facility] Board that in order to use Iontophoresis in Physical Therapy, the prescription must have the dosages included. We apologize for the inconvenience.     [x] Iontophoresis with 4 mg/ml Dexamethasone Sodium Phosphate    [x] Dosage: 24-120mAmin     [x]  Medication allergies reviewed for use of    Dexamethasone Sodium Phosphate 4mg/ml     with iontophoresis treatments.   Pt is not allergic.    [] Other:        Requested Frequency/Duration: 3 times per week for 12 treatments.    Thank you for this referral.  Electronically signed by: Daniel Torres PT    Physician Signature:________________________________Date:__________________    *PLEASE CO-SIGN OR SIGN ABOVE AND FAX BACK ALL PAGES*    
Lying Hip IR            Left Side Lying Hip Abduction            Manual Hamstring Stretch 15 Seconds           Manual Glute Medius Stretch 3x15 Seconds             Pain altered Tx:  [] Yes  [x] No  Action:    Pt. Education:  [x] Plans/Goals, Risks/Benefits discussed  [x] Home exercise program:   Method of Education: [x] Verbal  [x] Demo  [x] Written  Comprehension of Education:  [x] Verbalizes understanding.  [x] Demonstrates understanding.  [x] Needs Review.  [] Demonstrates/verbalizes understanding of HEP/Ed previously given.      Specific Instructions for next treatment:: Progress PREs and Stretching. Add Ultrasound and Iontophoresis.      Evaluation Complexity:  History (Personal factors, comorbidities) [] 0 [x] 1-2 [] 3+   Exam (limitations, restrictions) [x] 1-2 [] 3 [] 4+   Clinical presentation (progression) [x] Stable [] Evolving  [] Unstable   Decision Making [x] Low [] Moderate [] High    [x] Low Complexity [] Moderate Complexity [] High Complexity       Treatment Charges: Mins Time In/Out Units   [x] Evaluation       [x]  Low       []  Moderate       []  High 35 0845/0920 1   []  Modalities      [x]  Ther Exercise 10 0920/0930 1   []  Manual Therapy      []  Ther Activities      []  Aquatics      []  Vasocompression      []  Other        TOTAL TREATMENT TIME: 45      Time in:0845     Time out:0930    Electronically signed by: Daniel Torres PT        Physician Signature:________________________________Date:__________________  By signing above or cosigning this note, I have reviewed this plan of care and certify a need for medically necessary rehabilitation services.     *PLEASE SIGN ABOVE AND FAX BACK ALL PAGES*

## 2024-02-26 ENCOUNTER — HOSPITAL ENCOUNTER (OUTPATIENT)
Age: 64
Setting detail: THERAPIES SERIES
Discharge: HOME | End: 2024-02-26
Payer: MEDICARE

## 2024-02-26 PROCEDURE — 97035 APP MDLTY 1+ULTRASOUND EA 15: CPT

## 2024-02-26 PROCEDURE — 97110 THERAPEUTIC EXERCISES: CPT

## 2024-02-26 PROCEDURE — 97140 MANUAL THERAPY 1/> REGIONS: CPT

## 2024-02-26 NOTE — FLOWSHEET NOTE
[x] [facility]  Outpatient Rehabilitation &  Therapy  [address] [place] [address]  [phone]  F: [phone]             Physical Therapy Daily Treatment Note    Date:  2024  Patient Name:  Isabel Leon    :  1960  MRN: [id number]  Physician: Bertin Lee APRN - NP                         Insurance: MEDICARE/[facility]   Medical Diagnosis: Right Hip Tendonitis                   Rehab Codes: M76.891 M25.551  M25.651  Onset Date: 2023                                 Next 's appt: None Scheduled  Medicare: $176.70  Visit# / total visits: ; Progress note for Medicare patient due at visit 10     Cancels/No Shows: 0/0    Subjective:    Pain:  [x] Yes  [] No Location: Right Hip Pain Rating: (0-10 scale) 3/10  Pain altered Tx:  [x] No  [] Yes  Action: None  Comments: Pt reports she did experience some mild soreness following her initial evaluation. She states she did perform her HEP as directed without adverse effect. She states she felt good all week secondary to being off her feet ill with a cold, however yesterday did have some soreness secondary to being on her feet more.    Objective:   Pt presents ambulating without Assistive Device with Right LE IR and Uncompensated Trendelenburg Gait Right. Right Foot is also Pronated.  Standing Posture: Left Ilium and Scapula Depressed.  Trunk AROM: Flexion - Finger Tips to Distal Tibias with increased Right Hip pain while descending, Extension WFL, Lateral Flexion Left WFL and Right blocked with production of Anterior Right Pelvic pain, and Rotation WFL.  Differentiating Lumbar Spine from Hip was unremarkable.  Hip Hikes: weakness and pain Right.  Right Hip PROM: Flexion WFL, Abduction WFL, Extension WFL, IR WFL and ER WFL with none effecting pt's pain.  Right Hip Strength: Flexors 4/5, Extensors 4/5, Abductors in Side Lying 4-/5 (13.2 lbs), IR in Side Lying 4-/5 (18.6 lbs), IR Seated 4-/5 (21.7 lbs) and ER Seated 4/5. MMT IR and

## 2024-02-28 ENCOUNTER — HOSPITAL ENCOUNTER (OUTPATIENT)
Age: 64
Setting detail: THERAPIES SERIES
Discharge: HOME | End: 2024-02-28
Payer: MEDICARE

## 2024-02-28 PROCEDURE — 97110 THERAPEUTIC EXERCISES: CPT

## 2024-02-28 PROCEDURE — 97035 APP MDLTY 1+ULTRASOUND EA 15: CPT

## 2024-02-28 PROCEDURE — 97140 MANUAL THERAPY 1/> REGIONS: CPT

## 2024-02-28 NOTE — FLOWSHEET NOTE
pain.  Glute Medius Stretch increased pt's Lateral Right Hip pain.  WOMAC Score: 45/96  Above recorded at Initial Evaluation on February 15, 2024.     Modalities: Ultrasound 100% at 1.25 w/cm2 x8 Minutes with Right Hip Abductor in Side Lying.  Precautions: None  Exercises:  Exercise Reps/ Time Weight/ Level Comments   Nustep  5 Minutes  Level 4               Side Stepping With Theraband  30 Feet x2  Green   Increased Pain On Second Lap             Hip Hikes Right  15                 Hip Hikes With Left LE Swings  15                 3-Way Kicks With Theraband Right 15 Red  Left 10 Reps             Hip Flexion With Theraband Right 15 Red               Seated Right Hip IR And ER With Theraband  15  Blue               Left Side Lying Clam  15  Green  Increased Pain             Left Side Lying Hip IR  15  2 lbs               Left Side Lying Hip Abduction  10x5 Seconds                 IT Band Stretch   3x15 Seconds           Manual Hamstring Stretch 15 Seconds                 Manual Glute Medius Stretch 3x15 Seconds                 Other:      Treatment Charges: Mins Units   [x]  Modalities US 10 1   [x]  Ther Exercise 25 1   [x]  Manual Therapy 10 1   []  Ther Activities     []  Neuro Re-ed     []  Vasocompression     [] Gait     []  Other     Total Billable time 45 3       Assessment: Exercises were progressed in the clinic today. No Goals Met.     STG: (to be met in 12 treatments)  Improve Right Hip Abductor Strength to 4/5 to facilitate Hip Stability during Gait and Ascending Stairs.  Improve Right Hip IR Strength to 4/5 to facilitate Hip Stability during Gait and Ascending Stairs.  Improve Right Hip Pain to no greater than 3/10 to facilitate Gait and Arising from the floor.  Independent with Home Exercise Programs.     LTG: (to be met in 24 treatments)  Improve WOMAC Score from 45/96 to 22/96 to facilitate Gait, Ascending Stairs, Arising from the Floor and Lifting her Leg into bed.       Pt. Education:  [x]

## 2024-03-01 ENCOUNTER — HOSPITAL ENCOUNTER (OUTPATIENT)
Age: 64
Setting detail: THERAPIES SERIES
Discharge: HOME | End: 2024-03-01

## 2024-03-01 NOTE — FLOWSHEET NOTE
[x] [facility]  Outpatient Rehabilitation &  Therapy  [address] [place] [address]  [phone]  F: [phone]             Physical Therapy Daily Treatment Note    Date:  3/1/2024  Patient Name:  Isabel Leon    :  1960  MRN: [id number]  Physician: Bertin Lee APRN - NP                         Insurance: MEDICARE/[facility]   Medical Diagnosis: Right Hip Tendonitis                   Rehab Codes: M76.891 M25.551  M25.651  Onset Date: 2023                                 Next 's appt: None Scheduled  Medicare: $294.70  Visit# / total visits: ; Progress note for Medicare patient due at visit 10     Cancels/No Shows: 0/0    Subjective:    Pain:  [x] Yes  [] No Location: Right Hip Pain Rating: (0-10 scale) 2/10  Pain altered Tx:  [x] No  [] Yes  Action: None  Comments: Pt reports her symptoms have improved since her previous Therapy session. She states she is having less difficulty donning her shoes. She states she did work yesterday with minimal exacerbation in her pain and was able to perform her HEP without difficulty.     Objective:   Pt presents ambulating without Assistive Device with Right LE IR and Uncompensated Trendelenburg Gait Right. Right Foot is also Pronated.  Standing Posture: Left Ilium and Scapula Depressed.  Trunk AROM: Flexion - Finger Tips to Distal Tibias with increased Right Hip pain while descending, Extension WFL, Lateral Flexion Left WFL and Right blocked with production of Anterior Right Pelvic pain, and Rotation WFL.  Differentiating Lumbar Spine from Hip was unremarkable.  Hip Hikes: weakness and pain Right.  Right Hip PROM: Flexion WFL, Abduction WFL, Extension WFL, IR WFL and ER WFL with none effecting pt's pain.  Right Hip Strength: Flexors 4/5, Extensors 4/5, Abductors in Side Lying 4-/5 (13.2 lbs), IR in Side Lying 4-/5 (18.6 lbs), IR Seated 4-/5 (21.7 lbs) and ER Seated 4/5. MMT IR and Abductors increased pt's pain.  Scour (-) with pt

## 2024-03-04 ENCOUNTER — HOSPITAL ENCOUNTER (OUTPATIENT)
Age: 64
Setting detail: THERAPIES SERIES
Discharge: HOME | End: 2024-03-04
Payer: MEDICARE

## 2024-03-04 PROCEDURE — 97110 THERAPEUTIC EXERCISES: CPT

## 2024-03-04 PROCEDURE — 97140 MANUAL THERAPY 1/> REGIONS: CPT

## 2024-03-04 PROCEDURE — 97035 APP MDLTY 1+ULTRASOUND EA 15: CPT

## 2024-03-04 NOTE — FLOWSHEET NOTE
[x] [facility]  Outpatient Rehabilitation &  Therapy  [address] [place] [address]  [phone]  F: [phone]             Physical Therapy Daily Treatment Note    Date:  3/4/2024  Patient Name:  Isabel Leon    :  1960  MRN: [id number]  Physician: Bertin Lee APRN - NP                         Insurance: MEDICARE/[facility]   Medical Diagnosis: Right Hip Tendonitis                   Rehab Codes: M76.891 M25.551  M25.651  Onset Date: 2023                                 Next 's appt: None Scheduled  Medicare: $353.70  Visit# / total visits: ; Progress note for Medicare patient due at visit 10     Cancels/No Shows: 0/0    Subjective:    Pain:  [x] Yes  [] No Location: Right Hip Pain Rating: (0-10 scale) 2/10  Pain altered Tx:  [x] No  [] Yes  Action: None  Comments: Pt reports her symptoms have improved since her previous Therapy session. She states she is a little sore this morning secondary to walking downtown [employer] for a karen event. She states she has been performing her HEP as directed with minimal soreness noted.      Objective:   Pt presents ambulating without Assistive Device with Right LE IR and Uncompensated Trendelenburg Gait Right. Right Foot is also Pronated.  Standing Posture: Left Ilium and Scapula Depressed.  Trunk AROM: Flexion - Finger Tips to Distal Tibias with increased Right Hip pain while descending, Extension WFL, Lateral Flexion Left WFL and Right blocked with production of Anterior Right Pelvic pain, and Rotation WFL.  Differentiating Lumbar Spine from Hip was unremarkable.  Hip Hikes: weakness and pain Right.  Right Hip PROM: Flexion WFL, Abduction WFL, Extension WFL, IR WFL and ER WFL with none effecting pt's pain.  Right Hip Strength: Flexors 4/5, Extensors 4/5, Abductors in Side Lying 4-/5 (13.2 lbs), IR in Side Lying 4-/5 (18.6 lbs), IR Seated 4-/5 (21.7 lbs) and ER Seated 4/5. MMT IR and Abductors increased pt's pain.  Scour (-) with pt

## 2024-03-06 ENCOUNTER — HOSPITAL ENCOUNTER (OUTPATIENT)
Age: 64
Setting detail: THERAPIES SERIES
Discharge: HOME | End: 2024-03-06
Payer: MEDICARE

## 2024-03-06 PROCEDURE — 97110 THERAPEUTIC EXERCISES: CPT

## 2024-03-06 PROCEDURE — 97140 MANUAL THERAPY 1/> REGIONS: CPT

## 2024-03-06 PROCEDURE — 97035 APP MDLTY 1+ULTRASOUND EA 15: CPT

## 2024-03-06 NOTE — FLOWSHEET NOTE
[x] [facility]  Outpatient Rehabilitation &  Therapy  [address] [place] [address]  [phone]  F: [phone]             Physical Therapy Daily Treatment Note    Date:  3/6/2024  Patient Name:  Isabel Leon    :  1960  MRN: [id number]  Physician: Bertin Lee APRN - NP                         Insurance: MEDICARE/[facility]   Medical Diagnosis: Right Hip Tendonitis                   Rehab Codes: M76.891 M25.551  M25.651  Onset Date: 2023                                 Next 's appt: None Scheduled  Medicare: $412.70  Visit# / total visits: ; Progress note for Medicare patient due at visit 10     Cancels/No Shows: 0/0    Subjective:    Pain:  [x] Yes  [] No Location: Right Hip Pain Rating: (0-10 scale) 2/10  Pain altered Tx:  [x] No  [] Yes  Action: None  Comments: Pt reports she was sore following her previous Therapy session with the soreness persisting through yesterday. She states she did awaken this morning back to baseline. She states she performed her HEP yesterday morning without effect.      Objective:   Pt presents ambulating without Assistive Device with Right LE IR and Uncompensated Trendelenburg Gait Right. Right Foot is also Pronated.  Standing Posture: Left Ilium and Scapula Depressed.  Trunk AROM: Flexion - Finger Tips to Distal Tibias with increased Right Hip pain while descending, Extension WFL, Lateral Flexion Left WFL and Right blocked with production of Anterior Right Pelvic pain, and Rotation WFL.  Differentiating Lumbar Spine from Hip was unremarkable.  Hip Hikes: weakness and pain Right.  Right Hip PROM: Flexion WFL, Abduction WFL, Extension WFL, IR WFL and ER WFL with none effecting pt's pain.  Right Hip Strength: Flexors 4/5, Extensors 4/5, Abductors in Side Lying 4-/5 (13.2 lbs), IR in Side Lying 4-/5 (18.6 lbs), IR Seated 4-/5 (21.7 lbs) and ER Seated 4/5. MMT IR and Abductors increased pt's pain.  Scour (-) with pt reporting increased Lateral

## 2024-03-08 ENCOUNTER — HOSPITAL ENCOUNTER (OUTPATIENT)
Age: 64
Setting detail: THERAPIES SERIES
Discharge: HOME | End: 2024-03-08
Payer: MEDICARE

## 2024-03-08 PROCEDURE — 97110 THERAPEUTIC EXERCISES: CPT

## 2024-03-08 PROCEDURE — 97035 APP MDLTY 1+ULTRASOUND EA 15: CPT

## 2024-03-08 PROCEDURE — 97140 MANUAL THERAPY 1/> REGIONS: CPT

## 2024-03-08 NOTE — FLOWSHEET NOTE
[x] [facility]  Outpatient Rehabilitation &  Therapy  [address] [place] [address]  [phone]  F: [phone]             Physical Therapy Daily Treatment Note    Date:  3/8/2024  Patient Name:  Isabel Leon    :  1960  MRN: [id number]  Physician: Bertin Lee APRN - NP                         Insurance: MEDICARE/[facility]   Medical Diagnosis: Right Hip Tendonitis                   Rehab Codes: M76.891 M25.551  M25.651  Onset Date: 2023                                 Next 's appt: None Scheduled  Medicare: $471.70  Visit# / total visits: ; Progress note for Medicare patient due at visit 10     Cancels/No Shows: 0/0    Subjective:    Pain:  [x] Yes  [] No Location: Right Hip Pain Rating: (0-10 scale) 1/10  Pain altered Tx:  [x] No  [] Yes  Action: None  Comments: Pt reports non adverse response to previous Therapy session. She states she did well yesterday going on two walks without exacerbation of her symptoms. She states her Hip is feeling pretty good this morning.     Objective:   Pt presents ambulating without Assistive Device with Right LE IR and Uncompensated Trendelenburg Gait Right. Right Foot is also Pronated.  Standing Posture: Left Ilium and Scapula Depressed.  Trunk AROM: Flexion - Finger Tips to Distal Tibias with increased Right Hip pain while descending, Extension WFL, Lateral Flexion Left WFL and Right blocked with production of Anterior Right Pelvic pain, and Rotation WFL.  Differentiating Lumbar Spine from Hip was unremarkable.  Hip Hikes: weakness and pain Right.  Right Hip PROM: Flexion WFL, Abduction WFL, Extension WFL, IR WFL and ER WFL with none effecting pt's pain.  Right Hip Strength: Flexors 4/5, Extensors 4/5, Abductors in Side Lying 4-/5 (13.2 lbs), IR in Side Lying 4-/5 (18.6 lbs), IR Seated 4-/5 (21.7 lbs) and ER Seated 4/5. MMT IR and Abductors increased pt's pain.  Scour (-) with pt reporting increased Lateral Right Hip pain.  Glute Medius

## 2024-03-11 ENCOUNTER — HOSPITAL ENCOUNTER (OUTPATIENT)
Age: 64
Setting detail: THERAPIES SERIES
Discharge: HOME | End: 2024-03-11
Payer: MEDICARE

## 2024-03-11 PROCEDURE — 97140 MANUAL THERAPY 1/> REGIONS: CPT

## 2024-03-11 PROCEDURE — 97110 THERAPEUTIC EXERCISES: CPT

## 2024-03-11 NOTE — FLOWSHEET NOTE
[x] [facility]  Outpatient Rehabilitation &  Therapy  [address] [place] [address]  [phone]  F: [phone]             Physical Therapy Daily Treatment Note    Date:  3/11/2024  Patient Name:  Isabel Leon    :  1960  MRN: [id number]  Physician: Bertin Lee APRN - NP                         Insurance: MEDICARE/[facility]   Medical Diagnosis: Right Hip Tendonitis                   Rehab Codes: M76.891 M25.551  M25.651  Onset Date: 2023                                 Next 's appt: None Scheduled  Medicare: $541.90  Visit# / total visits: ; Progress note for Medicare patient due at visit 10     Cancels/No Shows: 0/0    Subjective:    Pain:  [x] Yes  [] No Location: Right Hip Pain Rating: (0-10 scale) 1/10  Pain altered Tx:  [x] No  [] Yes  Action: None  Comments: Pt reports non adverse response to previous Therapy session. She states she did well over the weekend and did exercise at the gym on Saturday and  without adverse effect.     Objective:   Pt presents ambulating without Assistive Device with Right LE IR and Uncompensated Trendelenburg Gait Right. Right Foot is also Pronated.  Standing Posture: Left Ilium and Scapula Depressed.  Trunk AROM: Flexion - Finger Tips to Distal Tibias with increased Right Hip pain while descending, Extension WFL, Lateral Flexion Left WFL and Right blocked with production of Anterior Right Pelvic pain, and Rotation WFL.  Differentiating Lumbar Spine from Hip was unremarkable.  Hip Hikes: weakness and pain Right.  Right Hip PROM: Flexion WFL, Abduction WFL, Extension WFL, IR WFL and ER WFL with none effecting pt's pain.  Right Hip Strength: Flexors 4/5, Extensors 4/5, Abductors in Side Lying 4-/5 (13.2 lbs), IR in Side Lying 4-/5 (18.6 lbs), IR Seated 4-/5 (21.7 lbs) and ER Seated 4/5. MMT IR and Abductors increased pt's pain.  Scour (-) with pt reporting increased Lateral Right Hip pain.  Glute Medius Stretch increased pt's

## 2024-03-13 ENCOUNTER — HOSPITAL ENCOUNTER (OUTPATIENT)
Age: 64
Setting detail: THERAPIES SERIES
Discharge: HOME | End: 2024-03-13
Payer: MEDICARE

## 2024-03-13 PROCEDURE — 97035 APP MDLTY 1+ULTRASOUND EA 15: CPT

## 2024-03-13 PROCEDURE — 97110 THERAPEUTIC EXERCISES: CPT

## 2024-03-13 PROCEDURE — 97140 MANUAL THERAPY 1/> REGIONS: CPT

## 2024-03-13 NOTE — FLOWSHEET NOTE
treatments)  Improve WOMAC Score from 45/96 to 22/96 to facilitate Gait, Ascending Stairs, Arising from the Floor and Lifting her Leg into bed.       Pt. Education:  [x] Plans/Goals, Risks/Benefits discussed  [x] Home exercise program: [employer] #[id number] Pt issued Blue Theraband. Progress Side Stepping 30 Feet x2 With Green Theraband and perform 3-Way Kicks with both LE. Increase Right Hip Abduction to 2 Sets.  Method of Education: [x] Verbal  [x] Demo  [x] Written  Comprehension of Education:  [x] Verbalizes understanding.  [x] Demonstrates understanding.  [x] Needs Review.  [] Demonstrates/verbalizes understanding of HEP/Ed previously given.    Plan: [x] Continue current frequency toward long and short term goals.    [x] Specific Instructions for subsequent treatments: Progress Exercises.      Time In:0625            Time Out: 0725    Electronically signed by:  Daniel Torres, PT

## 2024-03-15 ENCOUNTER — HOSPITAL ENCOUNTER (OUTPATIENT)
Age: 64
Setting detail: THERAPIES SERIES
Discharge: HOME | End: 2024-03-15
Payer: MEDICARE

## 2024-03-15 PROCEDURE — 97035 APP MDLTY 1+ULTRASOUND EA 15: CPT

## 2024-03-15 PROCEDURE — 97110 THERAPEUTIC EXERCISES: CPT

## 2024-03-15 PROCEDURE — 97140 MANUAL THERAPY 1/> REGIONS: CPT

## 2024-03-15 NOTE — FLOWSHEET NOTE
[x] [facility]  Outpatient Rehabilitation &  Therapy  [address] [place] [address]  [phone]  F: [phone]             Physical Therapy Daily Treatment Note/Progress Note    Date:  3/15/2024  Patient Name:  Isabel Leon    :  1960  MRN: [id number]  Physician: Bertin Lee APRN - NP                         Insurance: MEDICARE/[facility]   Medical Diagnosis: Right Hip Tendonitis                   Rehab Codes: M76.891 M25.551  M25.651  Onset Date: 2023                                 Next 's appt: None Scheduled  Medicare: $659.90  Visit# / total visits: 10/24; Progress note for Medicare patient due at visit 10     Cancels/No Shows: 0/0    Subjective:    Pain:  [x] Yes  [] No Location: Right Hip Pain Rating: (0-10 scale) <1/10  Pain altered Tx:  [x] No  [] Yes  Action: None  Comments: Pt reports no adverse response to previous Therapy session. She states she was a little sore later in the day most likely secondary to the added weight with Side Lying Abduction Holds. She notes little to no pain/tenderness this morning and feels as though her symptoms are really improving.      Objective:   Pt presents ambulating without Assistive Device with Right LE IR and Uncompensated Trendelenburg Gait Right. Right Foot is also Pronated.  Standing Posture: Left Ilium and Scapula Depressed.  Trunk AROM: Flexion - Finger Tips to Distal Tibias with increased Right Hip pain while descending, Extension WFL, Lateral Flexion Left WFL and Right blocked with production of Anterior Right Pelvic pain, and Rotation WFL.  Differentiating Lumbar Spine from Hip was unremarkable.  Hip Hikes: weakness and pain Right.  Right Hip PROM: Flexion WFL, Abduction WFL, Extension WFL, IR WFL and ER WFL with none effecting pt's pain.  Right Hip Strength: Flexors 4/5, Extensors 4/5, Abductors in Side Lying 4-/5 (13.2 lbs), IR in Side Lying 4-/5 (18.6 lbs), IR Seated 4-/5 (21.7 lbs) and ER Seated 4/5. MMT IR and

## 2024-03-18 ENCOUNTER — HOSPITAL ENCOUNTER (OUTPATIENT)
Age: 64
Setting detail: THERAPIES SERIES
Discharge: HOME | End: 2024-03-18
Payer: MEDICARE

## 2024-03-18 PROCEDURE — 97035 APP MDLTY 1+ULTRASOUND EA 15: CPT

## 2024-03-18 PROCEDURE — 97110 THERAPEUTIC EXERCISES: CPT

## 2024-03-18 PROCEDURE — 97140 MANUAL THERAPY 1/> REGIONS: CPT

## 2024-03-18 NOTE — FLOWSHEET NOTE
[x] [facility]  Outpatient Rehabilitation &  Therapy  [address] [place] [address]  [phone]  F: [phone]             Physical Therapy Daily Treatment Note    Date:  3/18/2024  Patient Name:  Isabel Leon    :  1960  MRN: [id number]  Physician: Bertin Lee APRN - NP                         Insurance: MEDICARE/[facility]   Medical Diagnosis: Right Hip Tendonitis                   Rehab Codes: M76.891 M25.551  M25.651  Onset Date: 2023                                 Next 's appt: None Scheduled  Medicare: $718.90  Visit# / total visits: ; Progress note for Medicare patient due at visit 10     Cancels/No Shows: 0/0    Subjective:    Pain:  [x] Yes  [] No Location: Right Hip Pain Rating: (0-10 scale) <1/10  Pain altered Tx:  [x] No  [] Yes  Action: None  Comments: Pt reports no adverse response to previous Therapy session. She states she has been exercising at [employer] without adverse effect. She states performing the Side Stepping with the Theraband produces some soreness. She states she is able to sleep longer on her Right Side before soreness presents. She states the pain has not exceeded a 2/10 since her previous Therapy session. She states she did experience some soreness Friday after sitting in a metal chair and arising.       Objective:   Pt presents ambulating without Assistive Device with Right LE IR and Uncompensated Trendelenburg Gait Right. Right Foot is also Pronated.  Standing Posture: Left Ilium and Scapula Depressed.  Trunk AROM: Flexion - Finger Tips to Distal Tibias with increased Right Hip pain while descending, Extension WFL, Lateral Flexion Left WFL and Right blocked with production of Anterior Right Pelvic pain, and Rotation WFL.  Differentiating Lumbar Spine from Hip was unremarkable.  Hip Hikes: weakness and pain Right.  Right Hip PROM: Flexion WFL, Abduction WFL, Extension WFL, IR WFL and ER WFL with none effecting pt's pain.  Right Hip

## 2024-03-20 ENCOUNTER — HOSPITAL ENCOUNTER (OUTPATIENT)
Age: 64
Setting detail: THERAPIES SERIES
Discharge: HOME | End: 2024-03-20
Payer: MEDICARE

## 2024-03-20 PROCEDURE — 97110 THERAPEUTIC EXERCISES: CPT

## 2024-03-20 PROCEDURE — 97140 MANUAL THERAPY 1/> REGIONS: CPT

## 2024-03-20 PROCEDURE — 97035 APP MDLTY 1+ULTRASOUND EA 15: CPT

## 2024-03-20 NOTE — FLOWSHEET NOTE
[x] [facility]  Outpatient Rehabilitation &  Therapy  [address] [place] [address]  [phone]  F: [phone]             Physical Therapy Daily Treatment Note    Date:  3/20/2024  Patient Name:  Isabel Leon    :  1960  MRN: [id number]  Physician: Bertin Lee APRN - NP                         Insurance: MEDICARE/[facility]   Medical Diagnosis: Right Hip Tendonitis                   Rehab Codes: M76.891 M25.551  M25.651  Onset Date: 2023                                 Next 's appt: None Scheduled  Medicare: $777.90  Visit# / total visits: ; Progress note for Medicare patient due at visit 10     Cancels/No Shows: 0/0    Subjective:    Pain:  [x] Yes  [] No Location: Right Hip Pain Rating: (0-10 scale) 2/10  Pain altered Tx:  [x] No  [] Yes  Action: None  Comments: Pt reports no adverse response to previous Therapy session. She states she was a little sore afterwards and notes some soreness this morning secondary to being on her Feet at work yesterday. She does not work today.       Objective:   Pt presents ambulating without Assistive Device with Right LE IR and Uncompensated Trendelenburg Gait Right. Right Foot is also Pronated.  Standing Posture: Left Ilium and Scapula Depressed.  Trunk AROM: Flexion - Finger Tips to Distal Tibias with increased Right Hip pain while descending, Extension WFL, Lateral Flexion Left WFL and Right blocked with production of Anterior Right Pelvic pain, and Rotation WFL.  Differentiating Lumbar Spine from Hip was unremarkable.  Hip Hikes: weakness and pain Right.  Right Hip PROM: Flexion WFL, Abduction WFL, Extension WFL, IR WFL and ER WFL with none effecting pt's pain.  Right Hip Strength: Flexors 4/5, Extensors 4/5, Abductors in Side Lying 4-/5 (13.2 lbs), IR in Side Lying 4-/5 (18.6 lbs), IR Seated 4-/5 (21.7 lbs) and ER Seated 4/5. MMT IR and Abductors increased pt's pain.  Scour (-) with pt reporting increased Lateral Right Hip

## 2024-03-25 ENCOUNTER — HOSPITAL ENCOUNTER (OUTPATIENT)
Age: 64
Setting detail: THERAPIES SERIES
Discharge: HOME | End: 2024-03-25
Payer: MEDICARE

## 2024-03-25 PROCEDURE — 97035 APP MDLTY 1+ULTRASOUND EA 15: CPT

## 2024-03-25 PROCEDURE — 97140 MANUAL THERAPY 1/> REGIONS: CPT

## 2024-03-25 PROCEDURE — 97110 THERAPEUTIC EXERCISES: CPT

## 2024-03-25 NOTE — FLOWSHEET NOTE
[x] [facility]  Outpatient Rehabilitation &  Therapy  [address] [place] [address]  [phone]  F: [phone]             Physical Therapy Daily Treatment Note    Date:  3/25/2024  Patient Name:  Isabel Leon    :  1960  MRN: [id number]  Physician: Bertin Lee APRN - NP                         Insurance: MEDICARE/[facility]   Medical Diagnosis: Right Hip Tendonitis                   Rehab Codes: M76.891 M25.551  M25.651  Onset Date: 2023                                 Next 's appt: None Scheduled  Medicare: $836.90  Visit# / total visits: ; Progress note for Medicare patient due at visit 10     Cancels/No Shows: 0/0    Subjective:    Pain:  [x] Yes  [] No Location: Right Hip Pain Rating: (0-10 scale) <1/10  Pain altered Tx:  [x] No  [] Yes  Action: None  Comments: Pt reports no adverse response to previous Therapy session. She states her Right Hip pain did not exceed a 2/10 since her previous Therapy session. She states she did exercise at [employer] each day without adverse reaction. He states she was able to find a Cable Machine with light enough resistance to perform her 3-Way Kicks.        Objective:   Pt presents ambulating without Assistive Device with Right LE IR and Uncompensated Trendelenburg Gait Right. Right Foot is also Pronated.  Standing Posture: Left Ilium and Scapula Depressed.  Trunk AROM: Flexion - Finger Tips to Distal Tibias with increased Right Hip pain while descending, Extension WFL, Lateral Flexion Left WFL and Right blocked with production of Anterior Right Pelvic pain, and Rotation WFL.  Differentiating Lumbar Spine from Hip was unremarkable.  Hip Hikes: weakness and pain Right.  Right Hip PROM: Flexion WFL, Abduction WFL, Extension WFL, IR WFL and ER WFL with none effecting pt's pain.  Right Hip Strength: Flexors 4/5, Extensors 4/5, Abductors in Side Lying 4-/5 (13.2 lbs), IR in Side Lying 4-/5 (18.6 lbs), IR Seated 4-/5 (21.7 lbs) and ER

## 2024-03-26 ENCOUNTER — TELEPHONE (OUTPATIENT)
Age: 64
End: 2024-03-26

## 2024-03-26 NOTE — TELEPHONE ENCOUNTER
Patient called today, and spoke with Nicole, in regards to the Hepatitis C screening that was ordered by our providers.   Nicole came to me with a letter from Medicare that was scanned into the computer 3/18/24 regarding this order and claim.  After review I do not understand why it was not covered.  The CPT and ICD 10 codes on the order should have covered the screening that Medicare allows.    I LVM for the patient to call the office.  We need to ask the patient if she contacted [facility] or Medicare regarding this.    Does she have an explanation of Benefits from Medicare.

## 2024-03-26 NOTE — TELEPHONE ENCOUNTER
Isabel returned my call.  She called [facility] today and they informed her they are working on the redetermination process with medicare.  I told her what I found on line in regards to the service and she thanked me for looking into it.  She understands that [facility] is the provider for the service, and they have to contact medicare regarding this claim.  We can not.  I told the patient to call with any other questions she may have and I will help if I can.

## 2024-03-27 ENCOUNTER — HOSPITAL ENCOUNTER (OUTPATIENT)
Age: 64
Setting detail: THERAPIES SERIES
Discharge: HOME | End: 2024-03-27
Payer: MEDICARE

## 2024-03-27 PROCEDURE — 97035 APP MDLTY 1+ULTRASOUND EA 15: CPT

## 2024-03-27 PROCEDURE — 97110 THERAPEUTIC EXERCISES: CPT

## 2024-03-27 PROCEDURE — 97140 MANUAL THERAPY 1/> REGIONS: CPT

## 2024-03-27 NOTE — FLOWSHEET NOTE
[x] [facility]  Outpatient Rehabilitation &  Therapy  [address] [place] [address]  [phone]  F: [phone]             Physical Therapy Daily Treatment Note    Date:  3/27/2024  Patient Name:  Isabel Leon    :  1960  MRN: [id number]  Physician: Bertin Lee APRN - NP                         Insurance: MEDICARE/[facility]   Medical Diagnosis: Right Hip Tendonitis                   Rehab Codes: M76.891 M25.551  M25.651  Onset Date: 2023                                 Next 's appt: None Scheduled  Medicare: $895.90  Visit# / total visits: ; Progress note for Medicare patient due at visit 10     Cancels/No Shows: 0/0    Subjective:    Pain:  [x] Yes  [] No Location: Right Hip Pain Rating: (0-10 scale) <1/10  Pain altered Tx:  [x] No  [] Yes  Action: None  Comments: Pt reports no adverse response to previous Therapy session. She states she did work yesterday and exercised afterwards at [employer] without adverse response. She states she is feeling \"pretty good\" this morning.        Objective:   Pt presents ambulating without Assistive Device with Right LE IR and Uncompensated Trendelenburg Gait Right. Right Foot is also Pronated.  Standing Posture: Left Ilium and Scapula Depressed.  Trunk AROM: Flexion - Finger Tips to Distal Tibias with increased Right Hip pain while descending, Extension WFL, Lateral Flexion Left WFL and Right blocked with production of Anterior Right Pelvic pain, and Rotation WFL.  Differentiating Lumbar Spine from Hip was unremarkable.  Hip Hikes: weakness and pain Right.  Right Hip PROM: Flexion WFL, Abduction WFL, Extension WFL, IR WFL and ER WFL with none effecting pt's pain.  Right Hip Strength: Flexors 4/5, Extensors 4/5, Abductors in Side Lying 4-/5 (13.2 lbs), IR in Side Lying 4-/5 (18.6 lbs), IR Seated 4-/5 (21.7 lbs) and ER Seated 4/5. MMT IR and Abductors increased pt's pain.  Scour (-) with pt reporting increased Lateral Right Hip

## 2024-03-29 ENCOUNTER — HOSPITAL ENCOUNTER (OUTPATIENT)
Age: 64
Setting detail: THERAPIES SERIES
Discharge: HOME | End: 2024-03-29
Payer: MEDICARE

## 2024-03-29 NOTE — FLOWSHEET NOTE
[x] [facility]  Outpatient Rehabilitation &  Therapy  [address] [place] [address]  [phone]  F: [phone]             Physical Therapy Daily Treatment Note    Date:  3/29/2024  Patient Name:  Isabel Leon    :  1960  MRN: [id number]  Physician: Bertin Lee APRN - NP                         Insurance: MEDICARE/[facility]   Medical Diagnosis: Right Hip Tendonitis                   Rehab Codes: M76.891 M25.551  M25.651  Onset Date: 2023                                 Next 's appt: None Scheduled  Medicare: $954.90  Visit# / total visits: 15/24; Progress note for Medicare patient due at visit 20     Cancels/No Shows: 0/0    Subjective:    Pain:  [x] Yes  [] No Location: Right Hip Pain Rating: (0-10 scale) <1/10  Pain altered Tx:  [x] No  [] Yes  Action: None  Comments: Pt reports no adverse response to previous Therapy session. She states she is \"feeling pretty good\" this morning.       Objective:   Pt presents ambulating without Assistive Device with Right LE IR and Uncompensated Trendelenburg Gait Right. Right Foot is also Pronated.  Standing Posture: Left Ilium and Scapula Depressed.  Trunk AROM: Flexion - Finger Tips to Distal Tibias with increased Right Hip pain while descending, Extension WFL, Lateral Flexion Left WFL and Right blocked with production of Anterior Right Pelvic pain, and Rotation WFL.  Differentiating Lumbar Spine from Hip was unremarkable.  Hip Hikes: weakness and pain Right.  Right Hip PROM: Flexion WFL, Abduction WFL, Extension WFL, IR WFL and ER WFL with none effecting pt's pain.  Right Hip Strength: Flexors 4/5, Extensors 4/5, Abductors in Side Lying 4-/5 (13.2 lbs), IR in Side Lying 4-/5 (18.6 lbs), IR Seated 4-/5 (21.7 lbs) and ER Seated 4/5. MMT IR and Abductors increased pt's pain.  Scour (-) with pt reporting increased Lateral Right Hip pain.  Glute Medius Stretch increased pt's Lateral Right Hip pain.  WOMAC Score: 45/96  Above recorded at

## 2024-04-01 ENCOUNTER — HOSPITAL ENCOUNTER (OUTPATIENT)
Age: 64
Setting detail: THERAPIES SERIES
Discharge: HOME | End: 2024-04-01
Payer: MEDICARE

## 2024-04-01 PROCEDURE — 97110 THERAPEUTIC EXERCISES: CPT

## 2024-04-01 PROCEDURE — 97140 MANUAL THERAPY 1/> REGIONS: CPT

## 2024-04-01 PROCEDURE — 97035 APP MDLTY 1+ULTRASOUND EA 15: CPT

## 2024-04-01 NOTE — FLOWSHEET NOTE
[x] [facility]  Outpatient Rehabilitation &  Therapy  [address] [place] [address]  [phone]  F: [phone]             Physical Therapy Daily Treatment Note    Date:  2024  Patient Name:  Isabel Leon    :  1960  MRN: [id number]  Physician: Bertin Lee APRN - NP                         Insurance: MEDICARE/[facility]   Medical Diagnosis: Right Hip Tendonitis                   Rehab Codes: M76.891 M25.551  M25.651  Onset Date: 2023                                 Next 's appt: None Scheduled  Medicare: $1013.90  Visit# / total visits: ; Progress note for Medicare patient due at visit 20     Cancels/No Shows: 0/0    Subjective:    Pain:  [x] Yes  [] No Location: Right Hip Pain Rating: (0-10 scale) 0/10  Pain altered Tx:  [x] No  [] Yes  Action: None  Comments: Pt reports no adverse response to previous Therapy session. She states she has no pain this morning. She states she had no pain most of the day yesterday until towards the end. She states she did exercises at [employer] on Saturday, however did not  secondary to celebrating Easter.        Objective:   Pt presents ambulating without Assistive Device with Right LE IR and Uncompensated Trendelenburg Gait Right. Right Foot is also Pronated.  Standing Posture: Left Ilium and Scapula Depressed.  Trunk AROM: Flexion - Finger Tips to Distal Tibias with increased Right Hip pain while descending, Extension WFL, Lateral Flexion Left WFL and Right blocked with production of Anterior Right Pelvic pain, and Rotation WFL.  Differentiating Lumbar Spine from Hip was unremarkable.  Hip Hikes: weakness and pain Right.  Right Hip PROM: Flexion WFL, Abduction WFL, Extension WFL, IR WFL and ER WFL with none effecting pt's pain.  Right Hip Strength: Flexors 4/5, Extensors 4/5, Abductors in Side Lying 4-/5 (13.2 lbs), IR in Side Lying 4-/5 (18.6 lbs), IR Seated 4-/5 (21.7 lbs) and ER Seated 4/5. MMT IR and Abductors

## 2024-04-03 ENCOUNTER — HOSPITAL ENCOUNTER (OUTPATIENT)
Age: 64
Setting detail: THERAPIES SERIES
Discharge: HOME | End: 2024-04-03
Payer: MEDICARE

## 2024-04-03 PROCEDURE — 97110 THERAPEUTIC EXERCISES: CPT

## 2024-04-03 PROCEDURE — 97140 MANUAL THERAPY 1/> REGIONS: CPT

## 2024-04-03 PROCEDURE — 97035 APP MDLTY 1+ULTRASOUND EA 15: CPT

## 2024-04-03 NOTE — FLOWSHEET NOTE
[x] [facility]  Outpatient Rehabilitation &  Therapy  [address] [place] [address]  [phone]  F: [phone]             Physical Therapy Daily Treatment Note    Date:  4/3/2024  Patient Name:  Isabel Leon    :  1960  MRN: [id number]  Physician: Bertin Lee APRN - NP                         Insurance: MEDICARE/[facility]   Medical Diagnosis: Right Hip Tendonitis                   Rehab Codes: M76.891 M25.551  M25.651  Onset Date: 2023                                 Next 's appt: None Scheduled  Medicare: $1072.90  Visit# / total visits: ; Progress note for Medicare patient due at visit 20     Cancels/No Shows: 0/0    Subjective:    Pain:  [x] Yes  [] No Location: Right Hip Pain Rating: (0-10 scale) 0/10  Pain altered Tx:  [x] No  [] Yes  Action: None  Comments: Pt reports no adverse response to previous Therapy session. She states she did not get a chance to exercise yesterday secondary to dealing with a family member in the hospital. She states she developed a sore throat yesterday which persist this morning. She denies Right Hip pain yesterday or this morning.        Objective:   Pt presents ambulating without Assistive Device with Right LE IR and Uncompensated Trendelenburg Gait Right. Right Foot is also Pronated.  Standing Posture: Left Ilium and Scapula Depressed.  Trunk AROM: Flexion - Finger Tips to Distal Tibias with increased Right Hip pain while descending, Extension WFL, Lateral Flexion Left WFL and Right blocked with production of Anterior Right Pelvic pain, and Rotation WFL.  Differentiating Lumbar Spine from Hip was unremarkable.  Hip Hikes: weakness and pain Right.  Right Hip PROM: Flexion WFL, Abduction WFL, Extension WFL, IR WFL and ER WFL with none effecting pt's pain.  Right Hip Strength: Flexors 4/5, Extensors 4/5, Abductors in Side Lying 4-/5 (13.2 lbs), IR in Side Lying 4-/5 (18.6 lbs), IR Seated 4-/5 (21.7 lbs) and ER Seated 4/5. MMT IR and

## 2024-04-05 ENCOUNTER — HOSPITAL ENCOUNTER (OUTPATIENT)
Age: 64
Setting detail: THERAPIES SERIES
Discharge: HOME | End: 2024-04-05
Payer: MEDICARE

## 2024-04-05 ENCOUNTER — OFFICE VISIT (OUTPATIENT)
Age: 64
End: 2024-04-05
Payer: MEDICARE

## 2024-04-05 VITALS
BODY MASS INDEX: 28.71 KG/M2 | OXYGEN SATURATION: 96 % | WEIGHT: 156 LBS | HEIGHT: 62 IN | DIASTOLIC BLOOD PRESSURE: 80 MMHG | SYSTOLIC BLOOD PRESSURE: 130 MMHG | HEART RATE: 98 BPM | TEMPERATURE: 99.6 F

## 2024-04-05 DIAGNOSIS — J40 BRONCHITIS: Primary | ICD-10-CM

## 2024-04-05 LAB
INFLUENZA A ANTIBODY: NEGATIVE
INFLUENZA B ANTIBODY: NEGATIVE

## 2024-04-05 PROCEDURE — G8427 DOCREV CUR MEDS BY ELIG CLIN: HCPCS | Performed by: PHYSICIAN ASSISTANT

## 2024-04-05 PROCEDURE — 1123F ACP DISCUSS/DSCN MKR DOCD: CPT | Performed by: PHYSICIAN ASSISTANT

## 2024-04-05 PROCEDURE — 1090F PRES/ABSN URINE INCON ASSESS: CPT | Performed by: PHYSICIAN ASSISTANT

## 2024-04-05 PROCEDURE — G8399 PT W/DXA RESULTS DOCUMENT: HCPCS | Performed by: PHYSICIAN ASSISTANT

## 2024-04-05 PROCEDURE — 97110 THERAPEUTIC EXERCISES: CPT

## 2024-04-05 PROCEDURE — 3017F COLORECTAL CA SCREEN DOC REV: CPT | Performed by: PHYSICIAN ASSISTANT

## 2024-04-05 PROCEDURE — G8419 CALC BMI OUT NRM PARAM NOF/U: HCPCS | Performed by: PHYSICIAN ASSISTANT

## 2024-04-05 PROCEDURE — 99213 OFFICE O/P EST LOW 20 MIN: CPT | Performed by: PHYSICIAN ASSISTANT

## 2024-04-05 PROCEDURE — 87804 INFLUENZA ASSAY W/OPTIC: CPT | Performed by: PHYSICIAN ASSISTANT

## 2024-04-05 PROCEDURE — 97035 APP MDLTY 1+ULTRASOUND EA 15: CPT

## 2024-04-05 PROCEDURE — 97140 MANUAL THERAPY 1/> REGIONS: CPT

## 2024-04-05 PROCEDURE — 1036F TOBACCO NON-USER: CPT | Performed by: PHYSICIAN ASSISTANT

## 2024-04-05 RX ORDER — BENZONATATE 200 MG/1
200 CAPSULE ORAL 3 TIMES DAILY PRN
Qty: 30 CAPSULE | Refills: 0 | Status: SHIPPED | OUTPATIENT
Start: 2024-04-05 | End: 2024-04-15

## 2024-04-05 RX ORDER — AZITHROMYCIN 250 MG/1
250 TABLET, FILM COATED ORAL SEE ADMIN INSTRUCTIONS
Qty: 6 TABLET | Refills: 0 | Status: SHIPPED | OUTPATIENT
Start: 2024-04-05 | End: 2024-04-10

## 2024-04-05 RX ORDER — ALBUTEROL SULFATE 90 UG/1
2 AEROSOL, METERED RESPIRATORY (INHALATION) 4 TIMES DAILY PRN
Qty: 18 G | Refills: 0 | Status: SHIPPED | OUTPATIENT
Start: 2024-04-05

## 2024-04-05 ASSESSMENT — ENCOUNTER SYMPTOMS
VOMITING: 0
SORE THROAT: 0
SHORTNESS OF BREATH: 0
EYE PAIN: 0
BLOOD IN STOOL: 0
RHINORRHEA: 0
WHEEZING: 0
BACK PAIN: 0
COUGH: 1
NAUSEA: 0
CONSTIPATION: 0
ABDOMINAL PAIN: 0

## 2024-04-05 NOTE — FLOWSHEET NOTE
[x] [facility]  Outpatient Rehabilitation &  Therapy  [address] [place] [address]  [phone]  F: [phone]             Physical Therapy Daily Treatment Note/Discharge Note    Date:  2024  Patient Name:  Isabel Leon    :  1960  MRN: [id number]  Physician: Bertin Lee APRN - NP                         Insurance: MEDICARE/[facility]   Medical Diagnosis: Right Hip Tendonitis                   Rehab Codes: M76.891 M25.551  M25.651  Onset Date: 2023                                 Next 's appt: None Scheduled  Medicare: $1131.90  Visit# / total visits: ; Progress note for Medicare patient due at visit 20     Cancels/No Shows: 0/0    Subjective:    Pain:  [x] Yes  [] No Location: Right Hip Pain Rating: (0-10 scale) 0/10  Pain altered Tx:  [x] No  [] Yes  Action: None  Comments: Pt reports no adverse response to previous Therapy session. She states she has had no Right Hip pain over the past 48 hours. She states she feels as though her Right Hip is about 95% back to normal with ROM and Pain the limiting factors. She states her Low Back pain has been increased the past few days secondary to sitting more and not performing her Low Back exercises secondary to being ill. She states she is feeling better however not back to 100%.        Objective:   Pt presents ambulating without Assistive Device with Right LE IR and Uncompensated Trendelenburg Gait Right. Right Foot is also Pronated.  Standing Posture: Left Ilium and Scapula Depressed.  Trunk AROM: Flexion - Finger Tips to Distal Tibias with increased Right Hip pain while descending, Extension WFL, Lateral Flexion Left WFL and Right blocked with production of Anterior Right Pelvic pain, and Rotation WFL.  Differentiating Lumbar Spine from Hip was unremarkable.  Hip Hikes: weakness and pain Right.  Right Hip PROM: Flexion WFL, Abduction WFL, Extension WFL, IR WFL and ER WFL with none effecting pt's pain.  Right Hip Strength:

## 2024-04-05 NOTE — PROGRESS NOTES
[facility]     Date of Visit:  2024  Patient Name: Isabel Leon   Patient :  1960     CHIEF COMPLAINT:     Isabel Leon is a 63 y.o. female who presents today for an general visit to be evaluated for the following condition(s):  Chief Complaint   Patient presents with    Cough     Complains of Cough with no mucus, When she do her chest hurt, SOB while walking, No fever since 2024       HISTORY OF PRESENT ILLNESS    Was in ER Monday with mother-in-law  Started with cough, headache on Monday, no chest pain.  Not really wheezing.  Terrible cough.  Throat hurts from coughing.  No GI symptoms.  No chest pain.  Taking mucinex dm    REVIEW OF SYSTEMS     Review of Systems   Constitutional:  Negative for chills, fever and unexpected weight change.   HENT:  Negative for congestion, ear pain, hearing loss, rhinorrhea, sinus pressure, sinus pain, sneezing and sore throat.    Eyes:  Negative for pain, redness and visual disturbance.   Respiratory:  Positive for cough. Negative for shortness of breath and wheezing.    Cardiovascular:  Negative for chest pain, palpitations and leg swelling.   Gastrointestinal:  Negative for abdominal pain, blood in stool, constipation, diarrhea, nausea and vomiting.   Endocrine: Negative for cold intolerance and heat intolerance.   Genitourinary:  Negative for difficulty urinating, dysuria, frequency, hematuria and urgency.   Musculoskeletal:  Negative for arthralgias, back pain, gait problem, joint swelling, myalgias and neck pain.   Skin:  Negative for rash and wound.   Allergic/Immunologic: Negative for immunocompromised state.   Neurological:  Negative for dizziness, tremors, seizures, syncope, speech difficulty, weakness, light-headedness, numbness and headaches.   Psychiatric/Behavioral:  Negative for confusion, hallucinations and sleep disturbance. The patient is not nervous/anxious.         REVIEWED INFORMATION      Current Outpatient Medications

## 2024-12-27 SDOH — HEALTH STABILITY: PHYSICAL HEALTH: ON AVERAGE, HOW MANY DAYS PER WEEK DO YOU ENGAGE IN MODERATE TO STRENUOUS EXERCISE (LIKE A BRISK WALK)?: 5 DAYS

## 2024-12-27 SDOH — HEALTH STABILITY: PHYSICAL HEALTH: ON AVERAGE, HOW MANY MINUTES DO YOU ENGAGE IN EXERCISE AT THIS LEVEL?: 90 MIN

## 2024-12-27 ASSESSMENT — PATIENT HEALTH QUESTIONNAIRE - PHQ9
SUM OF ALL RESPONSES TO PHQ QUESTIONS 1-9: 0
1. LITTLE INTEREST OR PLEASURE IN DOING THINGS: NOT AT ALL
SUM OF ALL RESPONSES TO PHQ9 QUESTIONS 1 & 2: 0
2. FEELING DOWN, DEPRESSED OR HOPELESS: NOT AT ALL

## 2024-12-27 ASSESSMENT — LIFESTYLE VARIABLES
HOW OFTEN DO YOU HAVE SIX OR MORE DRINKS ON ONE OCCASION: 1
HOW MANY STANDARD DRINKS CONTAINING ALCOHOL DO YOU HAVE ON A TYPICAL DAY: 1
HOW OFTEN DO YOU HAVE A DRINK CONTAINING ALCOHOL: 2-4 TIMES A MONTH
HOW OFTEN DO YOU HAVE A DRINK CONTAINING ALCOHOL: 3
HOW MANY STANDARD DRINKS CONTAINING ALCOHOL DO YOU HAVE ON A TYPICAL DAY: 1 OR 2

## 2025-01-03 ENCOUNTER — HOSPITAL ENCOUNTER (OUTPATIENT)
Age: 65
Discharge: HOME | End: 2025-01-03
Payer: MEDICARE

## 2025-01-03 ENCOUNTER — HOSPITAL ENCOUNTER (OUTPATIENT)
Age: 65
End: 2025-01-03
Payer: MEDICARE

## 2025-01-03 ENCOUNTER — HOSPITAL ENCOUNTER (OUTPATIENT)
Age: 65
Setting detail: SPECIMEN
Discharge: HOME | End: 2025-01-03

## 2025-01-03 ENCOUNTER — OFFICE VISIT (OUTPATIENT)
Age: 65
End: 2025-01-03
Payer: MEDICARE

## 2025-01-03 VITALS
OXYGEN SATURATION: 98 % | DIASTOLIC BLOOD PRESSURE: 90 MMHG | BODY MASS INDEX: 28.34 KG/M2 | SYSTOLIC BLOOD PRESSURE: 148 MMHG | HEART RATE: 76 BPM | HEIGHT: 62 IN | TEMPERATURE: 97.2 F | WEIGHT: 154 LBS

## 2025-01-03 DIAGNOSIS — E78.5 DYSLIPIDEMIA: ICD-10-CM

## 2025-01-03 DIAGNOSIS — R20.0 NUMBNESS AND TINGLING IN LEFT ARM: ICD-10-CM

## 2025-01-03 DIAGNOSIS — Z23 NEED FOR INFLUENZA VACCINATION: ICD-10-CM

## 2025-01-03 DIAGNOSIS — R06.02 SHORTNESS OF BREATH: ICD-10-CM

## 2025-01-03 DIAGNOSIS — R20.2 NUMBNESS AND TINGLING IN LEFT ARM: ICD-10-CM

## 2025-01-03 DIAGNOSIS — Z00.00 MEDICARE ANNUAL WELLNESS VISIT, SUBSEQUENT: Primary | ICD-10-CM

## 2025-01-03 DIAGNOSIS — Z12.31 ENCOUNTER FOR SCREENING MAMMOGRAM FOR BREAST CANCER: ICD-10-CM

## 2025-01-03 DIAGNOSIS — R06.09 DYSPNEA ON EXERTION: ICD-10-CM

## 2025-01-03 DIAGNOSIS — G43.109 MIGRAINE WITH AURA AND WITHOUT STATUS MIGRAINOSUS, NOT INTRACTABLE: ICD-10-CM

## 2025-01-03 LAB
ALBUMIN SERPL-MCNC: 4.5 G/DL (ref 3.5–5.2)
ALBUMIN/GLOB SERPL: 1.5 {RATIO} (ref 1–2.5)
ALP SERPL-CCNC: 113 U/L (ref 35–104)
ALT SERPL-CCNC: 25 U/L (ref 10–35)
ANION GAP SERPL CALCULATED.3IONS-SCNC: 13 MMOL/L (ref 9–16)
AST SERPL-CCNC: 27 U/L (ref 10–35)
BASOPHILS # BLD: 0.05 K/UL (ref 0–0.2)
BASOPHILS NFR BLD: 1 % (ref 0–2)
BILIRUB SERPL-MCNC: 0.3 MG/DL (ref 0–1.2)
BUN SERPL-MCNC: 16 MG/DL (ref 8–23)
CALCIUM SERPL-MCNC: 9.8 MG/DL (ref 8.6–10.4)
CHLORIDE SERPL-SCNC: 102 MMOL/L (ref 98–107)
CHOLEST SERPL-MCNC: 290 MG/DL (ref 0–199)
CHOLESTEROL/HDL RATIO: 4.1
CO2 SERPL-SCNC: 23 MMOL/L (ref 20–31)
CREAT SERPL-MCNC: 0.9 MG/DL (ref 0.6–0.9)
EOSINOPHIL # BLD: 0.12 K/UL (ref 0–0.44)
EOSINOPHILS RELATIVE PERCENT: 2 % (ref 1–4)
ERYTHROCYTE [DISTWIDTH] IN BLOOD BY AUTOMATED COUNT: 12.4 % (ref 11.8–14.4)
GFR, ESTIMATED: 71 ML/MIN/1.73M2
GLUCOSE SERPL-MCNC: 93 MG/DL (ref 74–99)
HCT VFR BLD AUTO: 43.8 % (ref 36.3–47.1)
HDLC SERPL-MCNC: 71 MG/DL
HGB BLD-MCNC: 14.3 G/DL (ref 11.9–15.1)
IMM GRANULOCYTES # BLD AUTO: <0.03 K/UL (ref 0–0.3)
IMM GRANULOCYTES NFR BLD: 0 %
LDLC SERPL CALC-MCNC: 183 MG/DL (ref 0–100)
LYMPHOCYTES NFR BLD: 1.81 K/UL (ref 1.1–3.7)
LYMPHOCYTES RELATIVE PERCENT: 28 % (ref 24–43)
MCH RBC QN AUTO: 30.3 PG (ref 25.2–33.5)
MCHC RBC AUTO-ENTMCNC: 32.6 G/DL (ref 28.4–34.8)
MCV RBC AUTO: 92.8 FL (ref 82.6–102.9)
MONOCYTES NFR BLD: 0.43 K/UL (ref 0.1–1.2)
MONOCYTES NFR BLD: 7 % (ref 3–12)
NEUTROPHILS NFR BLD: 62 % (ref 36–65)
NEUTS SEG NFR BLD: 4.15 K/UL (ref 1.5–8.1)
NRBC BLD-RTO: 0 PER 100 WBC
PLATELET # BLD AUTO: 261 K/UL (ref 138–453)
PMV BLD AUTO: 11.4 FL (ref 8.1–13.5)
POTASSIUM SERPL-SCNC: 4.9 MMOL/L (ref 3.7–5.3)
PROT SERPL-MCNC: 7.6 G/DL (ref 6.6–8.7)
RBC # BLD AUTO: 4.72 M/UL (ref 3.95–5.11)
SODIUM SERPL-SCNC: 138 MMOL/L (ref 136–145)
TRIGL SERPL-MCNC: 181 MG/DL
TSH SERPL DL<=0.05 MIU/L-ACNC: 2.23 UIU/ML (ref 0.27–4.2)
VLDLC SERPL CALC-MCNC: 36 MG/DL (ref 1–30)
WBC OTHER # BLD: 6.6 K/UL (ref 3.5–11.3)

## 2025-01-03 PROCEDURE — 90653 IIV ADJUVANT VACCINE IM: CPT | Performed by: PHYSICIAN ASSISTANT

## 2025-01-03 PROCEDURE — 71046 X-RAY EXAM CHEST 2 VIEWS: CPT

## 2025-01-03 PROCEDURE — G0008 ADMIN INFLUENZA VIRUS VAC: HCPCS | Performed by: PHYSICIAN ASSISTANT

## 2025-01-03 RX ORDER — CHOLECALCIFEROL (VITAMIN D3) 125 MCG
CAPSULE ORAL
COMMUNITY
Start: 2024-01-22

## 2025-01-03 SDOH — ECONOMIC STABILITY: FOOD INSECURITY: WITHIN THE PAST 12 MONTHS, YOU WORRIED THAT YOUR FOOD WOULD RUN OUT BEFORE YOU GOT MONEY TO BUY MORE.: NEVER TRUE

## 2025-01-03 SDOH — ECONOMIC STABILITY: INCOME INSECURITY: HOW HARD IS IT FOR YOU TO PAY FOR THE VERY BASICS LIKE FOOD, HOUSING, MEDICAL CARE, AND HEATING?: NOT VERY HARD

## 2025-01-03 SDOH — ECONOMIC STABILITY: FOOD INSECURITY: WITHIN THE PAST 12 MONTHS, THE FOOD YOU BOUGHT JUST DIDN'T LAST AND YOU DIDN'T HAVE MONEY TO GET MORE.: NEVER TRUE

## 2025-01-03 ASSESSMENT — ENCOUNTER SYMPTOMS
EYE PAIN: 0
SORE THROAT: 0
BLOOD IN STOOL: 0
CONSTIPATION: 0
RHINORRHEA: 0
SHORTNESS OF BREATH: 1
DIARRHEA: 0
ABDOMINAL PAIN: 0
EYE REDNESS: 0
COUGH: 0
BACK PAIN: 0
SINUS PAIN: 0
WHEEZING: 0
VOMITING: 0
SINUS PRESSURE: 0
NAUSEA: 0

## 2025-01-03 NOTE — PROGRESS NOTES
Medicare Annual Wellness Visit    Isabel Leon is here for Medicare AWV    Assessment & Plan   Medicare annual wellness visit, subsequent  Chart reviewed.  H&P done.  Meds reviewed.  Recent testing reviewed.  Vaccines reviewed.  Encounter for screening mammogram for breast cancer  -     [facility] DIGITAL SCREEN W OR WO CAD BILATERAL; Future  Migraine with aura and without status migrainosus, not intractable  Occasional headache that requires acetaminophen-butalbital once a month which works well for her.  Need for influenza vaccination  -     Influenza, FLUAD Trivalent, (age 65 y+), IM, Preservative Free, 0.5mL  Dyspnea on exertion  -     Comprehensive Metabolic Panel; Future  -     TSH; Future  -     CBC with Auto Differential; Future  -     Echo (TTE) complete (PRN contrast/bubble/strain/3D); Future  -     Nuclear stress test with myocardial perfusion; Future  -     EKG 12 Lead  -     XR CHEST (2 VW); Future  Patient reports about 6 months of shortness of breath going up stairs especially if carrying a load of laundry.  Not really any chest pain with it but she has also noticed left arm tingling and numbness as if she hit her funny bone that usually only occurs when she is doing some sort of exercise like running on a treadmill or walking.  Will get a stress test echo and labs done along with a chest x-ray as well.  Numbness and tingling in left arm  -     Comprehensive Metabolic Panel; Future  -     Lipid Panel; Future  -     TSH; Future  -     CBC with Auto Differential; Future  Dyslipidemia  -     Lipid Panel; Future  -     TSH; Future  Shortness of breath  -     Echo (TTE) complete (PRN contrast/bubble/strain/3D); Future  -     Nuclear stress test with myocardial perfusion; Future  -     EKG 12 Lead  -     XR CHEST (2 VW); Future     Return in about 2 weeks (around 1/17/2025) for 2-3 wks f/u testing.     Subjective       Patient's complete Health Risk Assessment and screening values have been reviewed and are

## 2025-01-07 ENCOUNTER — HOSPITAL ENCOUNTER (OUTPATIENT)
Dept: NUCLEAR MEDICINE | Age: 65
Discharge: HOME | End: 2025-01-09
Payer: MEDICARE

## 2025-01-07 ENCOUNTER — HOSPITAL ENCOUNTER (OUTPATIENT)
Age: 65
Discharge: HOME | End: 2025-01-09
Payer: MEDICARE

## 2025-01-07 VITALS — SYSTOLIC BLOOD PRESSURE: 130 MMHG | DIASTOLIC BLOOD PRESSURE: 83 MMHG | HEART RATE: 75 BPM

## 2025-01-07 DIAGNOSIS — R06.02 SHORTNESS OF BREATH: ICD-10-CM

## 2025-01-07 DIAGNOSIS — R06.02 SHORTNESS OF BREATH: Primary | ICD-10-CM

## 2025-01-07 DIAGNOSIS — R06.09 DYSPNEA ON EXERTION: ICD-10-CM

## 2025-01-07 LAB
ECHO AO ROOT DIAM: 2.5 CM
ECHO AV MEAN GRADIENT: 5 MMHG
ECHO AV MEAN VELOCITY: 1.1 M/S
ECHO AV PEAK GRADIENT: 9 MMHG
ECHO AV PEAK VELOCITY: 1.5 M/S
ECHO AV VELOCITY RATIO: 0.8
ECHO AV VTI: 34.6 CM
ECHO EST RA PRESSURE: 3 MMHG
ECHO IVC PROX: 1.7 CM
ECHO LA AREA 2C: 13.5 CM2
ECHO LA AREA 4C: 11.9 CM2
ECHO LA DIAMETER: 3.7 CM
ECHO LA MAJOR AXIS: 4 CM
ECHO LA MINOR AXIS: 4.1 CM
ECHO LA TO AORTIC ROOT RATIO: 1.48
ECHO LA VOL BP: 34 ML (ref 22–52)
ECHO LA VOL MOD A2C: 37 ML (ref 22–52)
ECHO LA VOL MOD A4C: 30 ML (ref 22–52)
ECHO LV E' LATERAL VELOCITY: 12.2 CM/S
ECHO LV E' SEPTAL VELOCITY: 7.4 CM/S
ECHO LV EDV 3D: 89 ML
ECHO LV EDV A2C: 44 ML
ECHO LV EDV A4C: 41 ML
ECHO LV EJECTION FRACTION 3D: 67 %
ECHO LV EJECTION FRACTION A2C: 63 %
ECHO LV EJECTION FRACTION A4C: 67 %
ECHO LV EJECTION FRACTION BIPLANE: 64 % (ref 55–100)
ECHO LV ESV 3D: 29 ML
ECHO LV ESV A2C: 16 ML
ECHO LV ESV A4C: 14 ML
ECHO LV FRACTIONAL SHORTENING: 35 % (ref 28–44)
ECHO LV INTERNAL DIMENSION DIASTOLIC: 4.3 CM (ref 3.9–5.3)
ECHO LV INTERNAL DIMENSION SYSTOLIC: 2.8 CM
ECHO LV IVSD: 0.7 CM (ref 0.6–0.9)
ECHO LV MASS 2D: 88.5 G (ref 67–162)
ECHO LV MASS 3D: 128 G
ECHO LV POSTERIOR WALL DIASTOLIC: 0.7 CM (ref 0.6–0.9)
ECHO LV RELATIVE WALL THICKNESS RATIO: 0.33
ECHO LVOT AV VTI INDEX: 0.71
ECHO LVOT MEAN GRADIENT: 3 MMHG
ECHO LVOT PEAK GRADIENT: 6 MMHG
ECHO LVOT PEAK VELOCITY: 1.2 M/S
ECHO LVOT VTI: 24.5 CM
ECHO MV A VELOCITY: 0.61 M/S
ECHO MV E DECELERATION TIME (DT): 219 MS
ECHO MV E VELOCITY: 0.9 M/S
ECHO MV E/A RATIO: 1.48
ECHO MV E/E' LATERAL: 7.38
ECHO MV E/E' RATIO (AVERAGED): 9.77
ECHO MV E/E' SEPTAL: 12.16
ECHO MV LVOT VTI INDEX: 1.02
ECHO MV MAX VELOCITY: 1 M/S
ECHO MV MEAN GRADIENT: 2 MMHG
ECHO MV MEAN VELOCITY: 0.6 M/S
ECHO MV PEAK GRADIENT: 4 MMHG
ECHO MV VTI: 25 CM
ECHO PV MAX VELOCITY: 1 M/S
ECHO PV PEAK GRADIENT: 4 MMHG
ECHO PVEIN A VELOCITY: 0.3 M/S
ECHO RA AREA 4C: 11.3 CM2
ECHO RA VOLUME: 24 ML
ECHO RIGHT VENTRICULAR SYSTOLIC PRESSURE (RVSP): 34 MMHG
ECHO RV BASAL DIMENSION: 3.8 CM
ECHO RV FREE WALL PEAK S': 13.9 CM/S
ECHO RV MID DIMENSION: 3 CM
ECHO RV TAPSE: 2.8 CM (ref 1.7–?)
ECHO TV REGURGITANT MAX VELOCITY: 2.79 M/S
ECHO TV REGURGITANT PEAK GRADIENT: 31 MMHG
STRESS ST DEPRESSION: 0 MM
STRESS TARGET HR: 154 BPM

## 2025-01-07 PROCEDURE — 93306 TTE W/DOPPLER COMPLETE: CPT

## 2025-01-07 PROCEDURE — 2500000003 HC RX 250 WO HCPCS: Performed by: PHYSICIAN ASSISTANT

## 2025-01-07 PROCEDURE — A9500 TC99M SESTAMIBI: HCPCS | Performed by: PHYSICIAN ASSISTANT

## 2025-01-07 PROCEDURE — 3430000000 HC RX DIAGNOSTIC RADIOPHARMACEUTICAL: Performed by: PHYSICIAN ASSISTANT

## 2025-01-07 PROCEDURE — 93017 CV STRESS TEST TRACING ONLY: CPT

## 2025-01-07 PROCEDURE — 78452 HT MUSCLE IMAGE SPECT MULT: CPT

## 2025-01-07 PROCEDURE — 93306 TTE W/DOPPLER COMPLETE: CPT | Performed by: INTERNAL MEDICINE

## 2025-01-07 PROCEDURE — 93018 CV STRESS TEST I&R ONLY: CPT | Performed by: INTERNAL MEDICINE

## 2025-01-07 PROCEDURE — 93016 CV STRESS TEST SUPVJ ONLY: CPT | Performed by: INTERNAL MEDICINE

## 2025-01-07 RX ORDER — SODIUM CHLORIDE 0.9 % (FLUSH) 0.9 %
10 SYRINGE (ML) INJECTION PRN
Status: DISCONTINUED | OUTPATIENT
Start: 2025-01-07 | End: 2025-01-10 | Stop reason: HOSPADM

## 2025-01-07 RX ORDER — ALBUTEROL SULFATE 90 UG/1
2 INHALANT RESPIRATORY (INHALATION) PRN
Status: DISCONTINUED | OUTPATIENT
Start: 2025-01-07 | End: 2025-01-07 | Stop reason: ALTCHOICE

## 2025-01-07 RX ORDER — METOPROLOL TARTRATE 1 MG/ML
5 INJECTION, SOLUTION INTRAVENOUS EVERY 5 MIN PRN
Status: DISCONTINUED | OUTPATIENT
Start: 2025-01-07 | End: 2025-01-07 | Stop reason: ALTCHOICE

## 2025-01-07 RX ORDER — ATROPINE SULFATE 0.1 MG/ML
0.5 INJECTION INTRAVENOUS EVERY 5 MIN PRN
Status: DISCONTINUED | OUTPATIENT
Start: 2025-01-07 | End: 2025-01-07 | Stop reason: ALTCHOICE

## 2025-01-07 RX ORDER — NITROGLYCERIN 0.4 MG/1
0.4 TABLET SUBLINGUAL EVERY 5 MIN PRN
Status: DISCONTINUED | OUTPATIENT
Start: 2025-01-07 | End: 2025-01-07 | Stop reason: ALTCHOICE

## 2025-01-07 RX ORDER — SODIUM CHLORIDE 0.9 % (FLUSH) 0.9 %
5-40 SYRINGE (ML) INJECTION PRN
Status: DISCONTINUED | OUTPATIENT
Start: 2025-01-07 | End: 2025-01-07 | Stop reason: ALTCHOICE

## 2025-01-07 RX ORDER — TETRAKIS(2-METHOXYISOBUTYLISOCYANIDE)COPPER(I) TETRAFLUOROBORATE 1 MG/ML
16 INJECTION, POWDER, LYOPHILIZED, FOR SOLUTION INTRAVENOUS
Status: COMPLETED | OUTPATIENT
Start: 2025-01-07 | End: 2025-01-07

## 2025-01-07 RX ORDER — SODIUM CHLORIDE 9 MG/ML
500 INJECTION, SOLUTION INTRAVENOUS CONTINUOUS PRN
Status: DISCONTINUED | OUTPATIENT
Start: 2025-01-07 | End: 2025-01-07 | Stop reason: ALTCHOICE

## 2025-01-07 RX ORDER — TETRAKIS(2-METHOXYISOBUTYLISOCYANIDE)COPPER(I) TETRAFLUOROBORATE 1 MG/ML
40 INJECTION, POWDER, LYOPHILIZED, FOR SOLUTION INTRAVENOUS
Status: COMPLETED | OUTPATIENT
Start: 2025-01-07 | End: 2025-01-07

## 2025-01-07 RX ADMIN — TETRAKIS(2-METHOXYISOBUTYLISOCYANIDE)COPPER(I) TETRAFLUOROBORATE 16 MILLICURIE: 1 INJECTION, POWDER, LYOPHILIZED, FOR SOLUTION INTRAVENOUS at 07:30

## 2025-01-07 RX ADMIN — SODIUM CHLORIDE, PRESERVATIVE FREE 10 ML: 5 INJECTION INTRAVENOUS at 07:30

## 2025-01-07 RX ADMIN — SODIUM CHLORIDE, PRESERVATIVE FREE 10 ML: 5 INJECTION INTRAVENOUS at 10:15

## 2025-01-07 RX ADMIN — TETRAKIS(2-METHOXYISOBUTYLISOCYANIDE)COPPER(I) TETRAFLUOROBORATE 40 MILLICURIE: 1 INJECTION, POWDER, LYOPHILIZED, FOR SOLUTION INTRAVENOUS at 10:15

## 2025-01-10 LAB
STRESS BASELINE DIAS BP: 83 MMHG
STRESS BASELINE HR: 74 BPM
STRESS BASELINE SYS BP: 130 MMHG
STRESS ESTIMATED WORKLOAD: 7 METS
STRESS EXERCISE DUR MIN: 6 MIN
STRESS PEAK DIAS BP: 62 MMHG
STRESS PEAK SYS BP: 176 MMHG
STRESS PERCENT HR ACHIEVED: 96 %
STRESS POST PEAK HR: 148 BPM
STRESS RATE PRESSURE PRODUCT: NORMAL BPM*MMHG
STRESS ST DEPRESSION: 0 MM
STRESS TARGET HR: 154 BPM

## 2025-01-21 ENCOUNTER — HOSPITAL ENCOUNTER (OUTPATIENT)
Age: 65
Discharge: HOME | End: 2025-01-23
Payer: MEDICARE

## 2025-01-21 DIAGNOSIS — Z12.31 ENCOUNTER FOR SCREENING MAMMOGRAM FOR BREAST CANCER: ICD-10-CM

## 2025-01-21 PROCEDURE — 77063 BREAST TOMOSYNTHESIS BI: CPT

## 2025-01-24 LAB
NUC STRESS EJECTION FRACTION: 81 %
NUC STRESS LV EDV: 50 ML (ref 56–104)
STRESS BASELINE DIAS BP: 83 MMHG
STRESS BASELINE HR: 74 BPM
STRESS BASELINE SYS BP: 130 MMHG
STRESS ESTIMATED WORKLOAD: 7 METS
STRESS EXERCISE DUR MIN: 6 MIN
STRESS PEAK DIAS BP: 62 MMHG
STRESS PEAK SYS BP: 176 MMHG
STRESS PERCENT HR ACHIEVED: 96 %
STRESS POST PEAK HR: 148 BPM
STRESS RATE PRESSURE PRODUCT: ABNORMAL BPM*MMHG
STRESS ST DEPRESSION: 0 MM
STRESS TARGET HR: 154 BPM
TID: 1.12

## 2025-01-27 ENCOUNTER — OFFICE VISIT (OUTPATIENT)
Age: 65
End: 2025-01-27
Payer: MEDICARE

## 2025-01-27 VITALS
WEIGHT: 157 LBS | OXYGEN SATURATION: 96 % | DIASTOLIC BLOOD PRESSURE: 80 MMHG | TEMPERATURE: 97.8 F | HEART RATE: 80 BPM | SYSTOLIC BLOOD PRESSURE: 122 MMHG | BODY MASS INDEX: 28.89 KG/M2 | HEIGHT: 62 IN

## 2025-01-27 DIAGNOSIS — G43.109 MIGRAINE WITH AURA AND WITHOUT STATUS MIGRAINOSUS, NOT INTRACTABLE: ICD-10-CM

## 2025-01-27 DIAGNOSIS — Z12.11 COLON CANCER SCREENING: ICD-10-CM

## 2025-01-27 DIAGNOSIS — M85.859 OSTEOPENIA OF HIP, UNSPECIFIED LATERALITY: ICD-10-CM

## 2025-01-27 DIAGNOSIS — E78.2 MIXED HYPERLIPIDEMIA: Primary | ICD-10-CM

## 2025-01-27 DIAGNOSIS — Z86.16 HISTORY OF COVID-19: ICD-10-CM

## 2025-01-27 PROCEDURE — 1036F TOBACCO NON-USER: CPT | Performed by: INTERNAL MEDICINE

## 2025-01-27 PROCEDURE — 1160F RVW MEDS BY RX/DR IN RCRD: CPT | Performed by: INTERNAL MEDICINE

## 2025-01-27 PROCEDURE — 1123F ACP DISCUSS/DSCN MKR DOCD: CPT | Performed by: INTERNAL MEDICINE

## 2025-01-27 PROCEDURE — 3017F COLORECTAL CA SCREEN DOC REV: CPT | Performed by: INTERNAL MEDICINE

## 2025-01-27 PROCEDURE — G8419 CALC BMI OUT NRM PARAM NOF/U: HCPCS | Performed by: INTERNAL MEDICINE

## 2025-01-27 PROCEDURE — 1159F MED LIST DOCD IN RCRD: CPT | Performed by: INTERNAL MEDICINE

## 2025-01-27 PROCEDURE — G8399 PT W/DXA RESULTS DOCUMENT: HCPCS | Performed by: INTERNAL MEDICINE

## 2025-01-27 PROCEDURE — 99214 OFFICE O/P EST MOD 30 MIN: CPT | Performed by: INTERNAL MEDICINE

## 2025-01-27 PROCEDURE — 1090F PRES/ABSN URINE INCON ASSESS: CPT | Performed by: INTERNAL MEDICINE

## 2025-01-27 PROCEDURE — G8427 DOCREV CUR MEDS BY ELIG CLIN: HCPCS | Performed by: INTERNAL MEDICINE

## 2025-01-27 SDOH — ECONOMIC STABILITY: FOOD INSECURITY: WITHIN THE PAST 12 MONTHS, THE FOOD YOU BOUGHT JUST DIDN'T LAST AND YOU DIDN'T HAVE MONEY TO GET MORE.: NEVER TRUE

## 2025-01-27 SDOH — ECONOMIC STABILITY: FOOD INSECURITY: WITHIN THE PAST 12 MONTHS, YOU WORRIED THAT YOUR FOOD WOULD RUN OUT BEFORE YOU GOT MONEY TO BUY MORE.: NEVER TRUE

## 2025-01-27 ASSESSMENT — ENCOUNTER SYMPTOMS
SHORTNESS OF BREATH: 0
NAUSEA: 0
WHEEZING: 0
ABDOMINAL PAIN: 0
EYE REDNESS: 0
RHINORRHEA: 0
SINUS PRESSURE: 0
BLOOD IN STOOL: 0
BACK PAIN: 0
CONSTIPATION: 0
COUGH: 0
VOMITING: 0
EYE PAIN: 0
SINUS PAIN: 0
SORE THROAT: 0
DIARRHEA: 0

## 2025-01-27 ASSESSMENT — PATIENT HEALTH QUESTIONNAIRE - PHQ9
SUM OF ALL RESPONSES TO PHQ QUESTIONS 1-9: 0
1. LITTLE INTEREST OR PLEASURE IN DOING THINGS: NOT AT ALL
SUM OF ALL RESPONSES TO PHQ9 QUESTIONS 1 & 2: 0
SUM OF ALL RESPONSES TO PHQ QUESTIONS 1-9: 0
SUM OF ALL RESPONSES TO PHQ QUESTIONS 1-9: 0
2. FEELING DOWN, DEPRESSED OR HOPELESS: NOT AT ALL
SUM OF ALL RESPONSES TO PHQ QUESTIONS 1-9: 0

## 2025-01-27 NOTE — PROGRESS NOTES
[facility]     Date of Visit:  2025  Patient Name: Isabel Leon   Patient :  1960     CHIEF COMPLAINT:     Isabel Leon is a 64 y.o. female who presents today for an general visit to be evaluated for the following condition(s):  Chief Complaint   Patient presents with    Results     Lab and stress test       HISTORY OF PRESENT ILLNESS    Cxr nl  Mammogram normal  Stress Myoview normal.  No evidence of ischemia echocardiogram excellent with EF 64%.  Mild mitral and tricuspid regurgitation.  Labs reviewed.    Exercise: 2d upper, 2 d lower body and 1-2d of cardio. No CP       REVIEW OF SYSTEMS     Review of Systems   Constitutional:  Negative for chills, fever and unexpected weight change.   HENT:  Negative for congestion, ear pain, hearing loss, rhinorrhea, sinus pressure, sinus pain, sneezing and sore throat.    Eyes:  Negative for pain, redness and visual disturbance.   Respiratory:  Negative for cough, shortness of breath and wheezing.    Cardiovascular:  Negative for chest pain, palpitations and leg swelling.   Gastrointestinal:  Negative for abdominal pain, blood in stool, constipation, diarrhea, nausea and vomiting.   Endocrine: Negative for cold intolerance and heat intolerance.   Genitourinary:  Negative for difficulty urinating, dysuria, frequency, hematuria and urgency.   Musculoskeletal:  Negative for arthralgias, back pain, gait problem, joint swelling, myalgias and neck pain.   Skin:  Negative for rash and wound.   Allergic/Immunologic: Negative for immunocompromised state.   Neurological:  Negative for dizziness, tremors, seizures, syncope, speech difficulty, weakness, light-headedness, numbness and headaches.   Psychiatric/Behavioral:  Negative for confusion, hallucinations and sleep disturbance. The patient is not nervous/anxious.         REVIEWED INFORMATION      Current Outpatient Medications   Medication Sig Dispense Refill    Calcium Carbonate-Vitamin D

## 2025-01-27 NOTE — PATIENT INSTRUCTIONS
Dear valued patient,    At [facility], we are committed to providing you with the best possible healthcare experience. To further enhance your convenience and streamline your access to our services, we would like to introduce you to the benefits of utilizing direct scheduling through the [facility] Portal.    Direct scheduling is a feature within the [facility] Portal that allows you to schedule appointments with your healthcare provider directly, without the need to make a phone call or wait for a callback. We understand that your time is wade, and we want to ensure that you have quick and easy access to our services whenever you need them.  Here are some reasons why you should consider utilizing direct scheduling through the [facility] Patient Portal:    1. Convenience: With direct scheduling, you can book appointments at any time that suits you best, 24 hours a day, 7 days a week. No more waiting on hold or playing phone tag with our office staff. It puts you in control of managing your healthcare appointments effortlessly.    2. Accessibility: The [facility] Patient Portal is accessible through your computer, smartphone, or tablet, allowing you to schedule appointments from the comfort of your home, office, or on the go. You can access your medical records, review test results, and communicate with your healthcare provider all in one secure and user-friendly platform.    3. Timesaving: By utilizing direct scheduling, you can avoid unnecessary delays and save wade time. You can easily browse the available appointment slots and select the one that works best for you, eliminating the back-and-forth communication typically required when scheduling via phone.    4. Reminders and notifications: [facility] sends automatic reminders for upcoming appointments, ensuring that you never miss an important visit. You can also receive notifications about test results and important

## 2025-04-22 ENCOUNTER — HOSPITAL ENCOUNTER (OUTPATIENT)
Age: 65
Setting detail: SPECIMEN
Discharge: HOME | End: 2025-04-22

## 2025-04-22 DIAGNOSIS — E78.2 MIXED HYPERLIPIDEMIA: ICD-10-CM

## 2025-04-22 LAB
CHOLEST SERPL-MCNC: 280 MG/DL (ref 0–199)
CHOLESTEROL/HDL RATIO: 4.3
HDLC SERPL-MCNC: 65 MG/DL
LDLC SERPL CALC-MCNC: 177 MG/DL (ref 0–100)
TRIGL SERPL-MCNC: 189 MG/DL
VLDLC SERPL CALC-MCNC: 38 MG/DL (ref 1–30)

## 2025-04-29 ENCOUNTER — RESULTS FOLLOW-UP (OUTPATIENT)
Age: 65
End: 2025-04-29

## 2025-04-29 DIAGNOSIS — E78.2 MIXED HYPERLIPIDEMIA: Primary | ICD-10-CM
